# Patient Record
Sex: FEMALE | Race: WHITE | NOT HISPANIC OR LATINO | Employment: UNEMPLOYED | URBAN - METROPOLITAN AREA
[De-identification: names, ages, dates, MRNs, and addresses within clinical notes are randomized per-mention and may not be internally consistent; named-entity substitution may affect disease eponyms.]

---

## 2017-03-06 ENCOUNTER — HOSPITAL ENCOUNTER (EMERGENCY)
Facility: HOSPITAL | Age: 32
Discharge: HOME | End: 2017-03-06
Attending: EMERGENCY MEDICINE
Payer: MEDICAID

## 2017-03-06 VITALS
WEIGHT: 155 LBS | DIASTOLIC BLOOD PRESSURE: 90 MMHG | HEIGHT: 67 IN | SYSTOLIC BLOOD PRESSURE: 136 MMHG | OXYGEN SATURATION: 99 % | HEART RATE: 85 BPM | TEMPERATURE: 98 F | RESPIRATION RATE: 18 BRPM | BODY MASS INDEX: 24.33 KG/M2

## 2017-03-06 DIAGNOSIS — K02.9 DENTAL CARIES: ICD-10-CM

## 2017-03-06 DIAGNOSIS — K08.89 DENTALGIA: Primary | ICD-10-CM

## 2017-03-06 LAB
B-HCG UR QL: NEGATIVE
CTP QC/QA: YES

## 2017-03-06 PROCEDURE — 63600175 PHARM REV CODE 636 W HCPCS: Performed by: EMERGENCY MEDICINE

## 2017-03-06 PROCEDURE — 25000003 PHARM REV CODE 250: Performed by: EMERGENCY MEDICINE

## 2017-03-06 PROCEDURE — 96372 THER/PROPH/DIAG INJ SC/IM: CPT

## 2017-03-06 PROCEDURE — 99284 EMERGENCY DEPT VISIT MOD MDM: CPT | Mod: 25

## 2017-03-06 RX ORDER — PENICILLIN V POTASSIUM 500 MG/1
500 TABLET, FILM COATED ORAL 4 TIMES DAILY
Qty: 40 TABLET | Refills: 0 | Status: SHIPPED | OUTPATIENT
Start: 2017-03-06 | End: 2017-03-16

## 2017-03-06 RX ORDER — KETOROLAC TROMETHAMINE 30 MG/ML
30 INJECTION, SOLUTION INTRAMUSCULAR; INTRAVENOUS
Status: COMPLETED | OUTPATIENT
Start: 2017-03-06 | End: 2017-03-06

## 2017-03-06 RX ORDER — CHLORHEXIDINE GLUCONATE ORAL RINSE 1.2 MG/ML
15 SOLUTION DENTAL 2 TIMES DAILY
Qty: 118 ML | Refills: 0 | Status: SHIPPED | OUTPATIENT
Start: 2017-03-06 | End: 2017-03-20

## 2017-03-06 RX ORDER — KETOROLAC TROMETHAMINE 10 MG/1
10 TABLET, FILM COATED ORAL EVERY 8 HOURS
Qty: 12 TABLET | Refills: 0 | Status: SHIPPED | OUTPATIENT
Start: 2017-03-06 | End: 2017-03-06

## 2017-03-06 RX ORDER — FAMOTIDINE 20 MG/1
20 TABLET, FILM COATED ORAL
Status: COMPLETED | OUTPATIENT
Start: 2017-03-06 | End: 2017-03-06

## 2017-03-06 RX ORDER — DIPHENHYDRAMINE HCL 50 MG
50 CAPSULE ORAL
Status: COMPLETED | OUTPATIENT
Start: 2017-03-06 | End: 2017-03-06

## 2017-03-06 RX ORDER — ACETAMINOPHEN AND CODEINE PHOSPHATE 300; 30 MG/1; MG/1
1 TABLET ORAL EVERY 6 HOURS PRN
Qty: 8 TABLET | Refills: 0 | Status: SHIPPED | OUTPATIENT
Start: 2017-03-06 | End: 2017-03-16

## 2017-03-06 RX ADMIN — FAMOTIDINE 20 MG: 20 TABLET, FILM COATED ORAL at 03:03

## 2017-03-06 RX ADMIN — DIPHENHYDRAMINE HYDROCHLORIDE 50 MG: 50 CAPSULE ORAL at 03:03

## 2017-03-06 RX ADMIN — KETOROLAC TROMETHAMINE 30 MG: 30 INJECTION, SOLUTION INTRAMUSCULAR at 02:03

## 2017-03-06 NOTE — ED AVS SNAPSHOT
[facility] [address]  [place] [address]               Latisha Guzmán   3/6/2017  2:28 PM   ED    Description:  Female : 1985   Department:  [facility]           Your Care was Coordinated By:     Provider Role From To    Chanda García MD Attending Provider 17 1431 --      Reason for Visit     Dental Pain           Diagnoses this Visit        Comments    Dentalgia    -  Primary     Dental caries           ED Disposition     None           To Do List           Follow-up Information     Follow up with [facility].    Specialty:  Dental General Practice    Why:  As needed    Contact information:    [address] [facility] [address]  [phone]         These Medications        Disp Refills Start End    penicillin v potassium (VEETID) 500 MG tablet 40 tablet 0 3/6/2017 3/16/2017    Take 1 tablet (500 mg total) by mouth 4 (four) times daily. - Oral    chlorhexidine (PERIDEX) 0.12 % solution 118 mL 0 3/6/2017 3/20/2017    Use as directed 15 mLs in the mouth or throat 2 (two) times daily. - Mouth/Throat    ketorolac (TORADOL) 10 mg tablet 12 tablet 0 3/6/2017     Take 1 tablet (10 mg total) by mouth every 8 (eight) hours. - Oral      [facility]     [facility] -  Assistance  Registered nurses in the [facility] provide clinical advisement, health education, appointment booking, and other advisory services.  Call for this free service at [phone].             Medications           START taking these NEW medications        Refills    penicillin v potassium (VEETID) 500 MG tablet 0    Sig: Take 1 tablet (500 mg total) by mouth 4 (four) times daily.    Class: Print    Route: Oral    chlorhexidine (PERIDEX) 0.12 % solution 0    Sig: Use as directed 15 mLs in the mouth or throat 2 (two) times daily.    Class: Print    Route: Mouth/Throat    ketorolac (TORADOL) 10 mg tablet 0  "   Sig: Take 1 tablet (10 mg total) by mouth every 8 (eight) hours.    Class: Print    Route: Oral      These medications were administered today        Dose Freq    ketorolac injection 30 mg 30 mg ED 1 Time    Sig: Inject 30 mg into the muscle ED 1 Time.    Class: Normal    Route: Intramuscular           Verify that the below list of medications is an accurate representation of the medications you are currently taking.  If none reported, the list may be blank. If incorrect, please contact your healthcare provider. Carry this list with you in case of emergency.           Current Medications     chlorhexidine (PERIDEX) 0.12 % solution Use as directed 15 mLs in the mouth or throat 2 (two) times daily.    ketorolac (TORADOL) 10 mg tablet Take 1 tablet (10 mg total) by mouth every 8 (eight) hours.    penicillin v potassium (VEETID) 500 MG tablet Take 1 tablet (500 mg total) by mouth 4 (four) times daily.           Clinical Reference Information           Your Vitals Were     BP Pulse Temp Resp Height Weight    154/96 (BP Location: Right arm, Patient Position: Sitting) 101 98.3 °F (36.8 °C) (Oral) 20 5' 7" (1.702 m) 70.3 kg (155 lb)    Last Period SpO2 BMI          03/03/2017 99% 24.28 kg/m2        Allergies as of 3/6/2017        Reactions    Tramadol Hives      Immunizations Administered on Date of Encounter - 3/6/2017     None      ED Micro, Lab, POCT     Start Ordered       Status Ordering Provider    03/06/17 0000 03/06/17 1434  POCT urine pregnancy     Comments:  This order was created through External Result Entry    Completed       ED Imaging Orders     None        Discharge Instructions         GO DIRECTLY TO [facility] EMERGENCY DEPARTMENT IF YOU HAVE ANY FACIAL OR JAW SWELLING, DIFFICULTY SWALLOWING OR PAINFUL SWALLOWING    Understanding Tooth Decay  Plaque is a sticky coating of bacteria and other substances that forms on your teeth and gums. It can cause 2 serious problems: tooth decay and gum " disease. These problems damage the teeth and gums, and may even lead to tooth loss. When the mouth is well cared for, tooth decay and gum disease can be reversed in their early stages. Better yet, you can prevent these problems from starting by brushing and flossing daily and avoiding between meal snacking on foods high in sugar and starch.     Once tooth decay eats through the enamel, it spreads quickly through the softer dentin.       After tooth decay is removed, the hole is filled with a hard material.      How tooth decay develops  Tooth decay happens when bacteria in plaque make acids that eat away at the tooth. Cavities (also called caries) are holes that form in the teeth. They are most common in places that are hard to reach with a toothbrush. This includes the grooves at the tops of the back teeth, and on the sides where the teeth touch. In late stages, tooth decay can be painful. It can also lead to tooth loss.  Treating tooth decay  Tooth decay can be treated to keep it from moving farther into the tooth. This is often done by filling cavities. First, any tooth decay is removed. This protects the tooth from more damage. Then, the cavity is filled with a hard material. This filling protects the damaged tooth and restores the tooth's surface. If the tooth is severely damaged by decay, other treatments are available.  Follow-up visits  Visit your dental team at least every 6 months for a checkup and cleaning. If youre being treated for tooth decay or gum disease, you may need more frequent visits. These visits will likely decrease as your mouth care efforts start to pay off. Keep flossing and brushing, and maintain a healthy diet. Follow any special instructions your dentist or dental hygienist gives you. And enjoy flashing your healthy smile!  Date Last Reviewed: 6/30/2015  © 4320-3398 [employer]. [address] [place] [address], [place] [address]. All rights reserved. This information is not  "intended as a substitute for professional medical care. Always follow your healthcare professional's instructions.        Dental Abscess    An abscess is a pocket of pus at the tip of a tooth root in your jaw bone. It is caused by an infection at the root of the tooth. It can cause pain and swelling of the gum, cheek, or jaw. Pain may spread from the tooth to your ear or the area of your jaw on the same side. If the abscess isnt treated, it appears as a bubble or swelling on the gum near the tooth. The pressure that builds in this swelling is the source of the pain. More serious infections cause your face to swell.  An abscess can be caused by a crack in the tooth, a cavity, a gum infection, or a combination of these. Once the pulp of the tooth is exposed, bacteria can spread down the roots to the tip. If the bacteria are not stopped, they can damage the bone and soft tissue, and an abscess can form.  Home care  Follow these guidelines when caring for yourself at home:  · Avoid hot and cold foods and drinks. Your tooth may be sensitive to changes in temperature. Dont chew on the side of the infected tooth.  · If your tooth is chipped or cracked, or if there is a large open cavity, put oil of cloves directly on the tooth to relieve pain. You can buy oil of cloves at drugstores. Some pharmacies carry an over-the-counter "toothache kit." This contains a paste that you can put on the exposed tooth to make it less sensitive.  · Put a cold pack on your jaw over the sore area to help reduce pain.  · You may use over-the-counter medicine to ease pain, unless another medicine was prescribed. If you have chronic liver or kidney disease, talk with your healthcare provider before using acetaminophen or ibuprofen. Also talk with your provider if youve had a stomach ulcer or GI bleeding.  · An antibiotic will be prescribed. Take it until finished, even if you are feeling better after a few days.  Follow-up care  Follow up with " your dentist or an oral surgeon, or as advised. Once an infection occurs in a tooth, it will continue to be a problem until the infection is drained. This is done through surgery or a root canal. Or you may need to have your tooth pulled.  Call 911  Call 911 if any of these occur:  · Unusual drowsiness  · Headache or stiff neck  · Weakness or fainting  · Difficulty swallowing, breathing, or opening your mouth  · Swollen eyelids  When to seek medical advice  Call your healthcare provider right away if any of these occur:  · Your face becomes more swollen or red  · Pain gets worse or spreads to your neck  · Fever of 100.4º F (38.0º C) or higher, or as directed by your healthcare provider  · Pus drains from the tooth  Date Last Reviewed: 10/1/2016  © 4477-9487 [employer]. [address] [place] [address]. All rights reserved. This information is not intended as a substitute for professional medical care. Always follow your healthcare professional's instructions.          [facility] Sign-Up     Activating your [facility] account is as easy as 1-2-3!     1) Visit [email or web address], select Sign Up Now, enter this activation code and your date of birth, then select Next.  [email or web address]  Expires: 4/20/2017  2:59 PM      2) Create a username and password to use when you visit [facility] in the future and select a security question in case you lose your password and select Next.    3) Enter your e-mail address and click Sign Up!    Additional Information  If you have questions, please e-mail [email or web address] or call [phone] to talk to our [facility] staff. Remember, [facility] is NOT to be used for urgent needs. For medical emergencies, dial 911.         Smoking Cessation     If you would like to quit smoking:   You may be eligible for free services if you are a [place] resident and started smoking cigarettes before September 1, 1988.  Call the Smoking Cessation Trust (SCT) toll free at (919)  657-5150 or (095) 894-6966.   Call 1-800-QUIT-NOW if you do not meet the above criteria.             [facility] complies with applicable Federal civil rights laws and does not discriminate on the basis of race, color, national origin, age, disability, or sex.        Language Assistance Services     ATTENTION: Language assistance services are available, free of charge. Please call [phone].      ATENCIÓN: Si habla español, tiene a ayers disposición servicios gratuitos de asistencia lingüística. Llame al [phone].     CHÚ Ý: N?u b?n nói Ti?ng Vi?t, có các d?ch v? h? tr? ngôn ng? mi?n phí dành cho b?n. G?i s? [phone].

## 2017-03-06 NOTE — DISCHARGE INSTRUCTIONS
GO DIRECTLY TO [facility] EMERGENCY DEPARTMENT IF YOU HAVE ANY FACIAL OR JAW SWELLING, DIFFICULTY SWALLOWING OR PAINFUL SWALLOWING    Understanding Tooth Decay  Plaque is a sticky coating of bacteria and other substances that forms on your teeth and gums. It can cause 2 serious problems: tooth decay and gum disease. These problems damage the teeth and gums, and may even lead to tooth loss. When the mouth is well cared for, tooth decay and gum disease can be reversed in their early stages. Better yet, you can prevent these problems from starting by brushing and flossing daily and avoiding between meal snacking on foods high in sugar and starch.     Once tooth decay eats through the enamel, it spreads quickly through the softer dentin.       After tooth decay is removed, the hole is filled with a hard material.      How tooth decay develops  Tooth decay happens when bacteria in plaque make acids that eat away at the tooth. Cavities (also called caries) are holes that form in the teeth. They are most common in places that are hard to reach with a toothbrush. This includes the grooves at the tops of the back teeth, and on the sides where the teeth touch. In late stages, tooth decay can be painful. It can also lead to tooth loss.  Treating tooth decay  Tooth decay can be treated to keep it from moving farther into the tooth. This is often done by filling cavities. First, any tooth decay is removed. This protects the tooth from more damage. Then, the cavity is filled with a hard material. This filling protects the damaged tooth and restores the tooth's surface. If the tooth is severely damaged by decay, other treatments are available.  Follow-up visits  Visit your dental team at least every 6 months for a checkup and cleaning. If youre being treated for tooth decay or gum disease, you may need more frequent visits. These visits will likely decrease as your mouth care efforts start to pay off. Keep  "flossing and brushing, and maintain a healthy diet. Follow any special instructions your dentist or dental hygienist gives you. And enjoy flashing your healthy smile!  Date Last Reviewed: 6/30/2015 © 2000-2016 [employer]. [address] [place] [address]. All rights reserved. This information is not intended as a substitute for professional medical care. Always follow your healthcare professional's instructions.        Dental Abscess    An abscess is a pocket of pus at the tip of a tooth root in your jaw bone. It is caused by an infection at the root of the tooth. It can cause pain and swelling of the gum, cheek, or jaw. Pain may spread from the tooth to your ear or the area of your jaw on the same side. If the abscess isnt treated, it appears as a bubble or swelling on the gum near the tooth. The pressure that builds in this swelling is the source of the pain. More serious infections cause your face to swell.  An abscess can be caused by a crack in the tooth, a cavity, a gum infection, or a combination of these. Once the pulp of the tooth is exposed, bacteria can spread down the roots to the tip. If the bacteria are not stopped, they can damage the bone and soft tissue, and an abscess can form.  Home care  Follow these guidelines when caring for yourself at home:  · Avoid hot and cold foods and drinks. Your tooth may be sensitive to changes in temperature. Dont chew on the side of the infected tooth.  · If your tooth is chipped or cracked, or if there is a large open cavity, put oil of cloves directly on the tooth to relieve pain. You can buy oil of cloves at [employer]. Some pharmacies carry an over-the-counter "toothache kit." This contains a paste that you can put on the exposed tooth to make it less sensitive.  · Put a cold pack on your jaw over the sore area to help reduce pain.  · You may use over-the-counter medicine to ease pain, unless another medicine was prescribed. If you have " chronic liver or kidney disease, talk with your healthcare provider before using acetaminophen or ibuprofen. Also talk with your provider if youve had a stomach ulcer or GI bleeding.  · An antibiotic will be prescribed. Take it until finished, even if you are feeling better after a few days.  Follow-up care  Follow up with your dentist or an oral surgeon, or as advised. Once an infection occurs in a tooth, it will continue to be a problem until the infection is drained. This is done through surgery or a root canal. Or you may need to have your tooth pulled.  Call 911  Call 911 if any of these occur:  · Unusual drowsiness  · Headache or stiff neck  · Weakness or fainting  · Difficulty swallowing, breathing, or opening your mouth  · Swollen eyelids  When to seek medical advice  Call your healthcare provider right away if any of these occur:  · Your face becomes more swollen or red  · Pain gets worse or spreads to your neck  · Fever of 100.4º F (38.0º C) or higher, or as directed by your healthcare provider  · Pus drains from the tooth  Date Last Reviewed: 10/1/2016  © 7860-1706 [employer]. [address] [place] [address]. All rights reserved. This information is not intended as a substitute for professional medical care. Always follow your healthcare professional's instructions.

## 2017-03-06 NOTE — ED NOTES
Top front gum pain, reports had abscess to area that opened today.  Spoke with dentist and was told to come to ED for abx treatment.

## 2017-03-06 NOTE — ED PROVIDER NOTES
"Encounter Date: 3/6/2017       History     Chief Complaint   Patient presents with    Dental Pain     top front denital pain unable to schedule apt for 2 weeks      Review of patient's allergies indicates:   Allergen Reactions    Tramadol Hives     HPI Comments: 31 Y/O F WITH DENTAL PAIN.  PT REPORTS "SINCE I WAS 11 MY TEETH JUST BREAK FOR NO REASON.  I HAD THEM FIXED UNTIL I WAS 20 BUT THEN MEDICAID STOPPED PAYING FOR IT. I HAVE RECENTLY GOTTEN MEDICAID AND I HAVE AN APPOINTMENT WITH A DENTIST TO PULL ALL OF THEM IN 2.5 WEEKS.  THEY HAVE BEEN HURTING SO BAD FOR A FEW WEEKS AND DRAINING GREEN STUFF."  NO FEVER/CHILLS.  NO SWOLLEN NODES.  NO PAINFUL OR DIFFICULTY SWALLOWING.  NO FACIAL SWELLING.  NO DENTAL TRAUMA.     The history is provided by the patient. No  was used.     History reviewed. No pertinent past medical history.  Past Surgical History:   Procedure Laterality Date    APPENDECTOMY       History reviewed. No pertinent family history.  Social History   Substance Use Topics    Smoking status: Current Every Day Smoker    Smokeless tobacco: None    Alcohol use No     Review of Systems   Constitutional: Negative for activity change, appetite change, chills and fever.   HENT: Positive for dental problem and ear pain. Negative for congestion, drooling, ear discharge, trouble swallowing and voice change.    Eyes: Negative.    Respiratory: Negative for cough and shortness of breath.    Cardiovascular: Negative for chest pain.   Gastrointestinal: Negative for nausea and vomiting.   Endocrine: Negative.    Genitourinary: Negative.    Musculoskeletal: Negative.    Skin: Negative for rash.   Allergic/Immunologic: Negative for immunocompromised state.   Neurological: Negative.    Hematological: Negative.    Psychiatric/Behavioral: Negative.    All other systems reviewed and are negative.      Physical Exam   Initial Vitals   BP Pulse Resp Temp SpO2   03/06/17 1423 03/06/17 1423 03/06/17 1423 " 03/06/17 1423 03/06/17 1423   154/96 101 20 98.3 °F (36.8 °C) 99 %     Physical Exam    Nursing note and vitals reviewed.  Constitutional: She appears well-developed and well-nourished. She is not diaphoretic. No distress.   HENT:   Head: Normocephalic and atraumatic.   Mouth/Throat: Uvula is midline and oropharynx is clear and moist.   DIFFUSE DENTAL CARIES.  NO ABSCESS NOTED.  GUMS ARE ERYTHEMATOUS.  DIFFUSE GINGIVAL TENDERNESS   Eyes: Conjunctivae are normal. No scleral icterus.   Neck: Normal range of motion. Neck supple.   Cardiovascular: Normal rate, regular rhythm and normal heart sounds. Exam reveals no gallop and no friction rub.    No murmur heard.  Pulmonary/Chest: Breath sounds normal. No respiratory distress. She has no wheezes. She has no rhonchi. She has no rales.   Abdominal: Soft. Bowel sounds are normal. She exhibits no distension. There is no tenderness. There is no rebound and no guarding.   Musculoskeletal: Normal range of motion.   Lymphadenopathy:     She has no cervical adenopathy.   Neurological: She is alert and oriented to person, place, and time.   Skin: Skin is warm and dry. No rash noted. No erythema.   Psychiatric: She has a normal mood and affect. Her behavior is normal.         ED Course   Procedures  Labs Reviewed - No data to display          Medical Decision Making:   Initial Assessment:   33 Y/O WITH POOR DENTITION, DIFFUSE BROKEN TEETH AND DENTAL CARIES.  NO ABSCESS OR FACIAL SWELLING ON EXAM.  PT IS AFEBRILE AND NON-TOXIC.  Differential Diagnosis:   DENTAL CARIES, DENTAL ABSCESS  ED Management:  PT WILL BE D/C HOME WITH RX FOR PCN AND PAIN MEDS.  SHE HAS A F/U APPT WITH DENTIST IN 2 WEEKS THAT I HAVE ENCOURAGED HER TO MOVE UP IF POSSIBLE.  I HAVE ALSO INSTRUCTED HER TO PRESENT TO [facility] ED IF SHE NOTICES ANY FACIAL OR JAW SWELLING, DIFFICULTY SWALLOWING, OR PAINFUL SWALLOWING.  PT UNDERSTANDS INSTRUCTIONS.     WHILE IN ED, PT REPORTED ITCHING S/P TORADOL INJECTION.  WE HAVE  TREATED HER WITH PO BENADRYL AND PEPCID.  SHE REPORTS NO ALLERGY TO CODEINE SO I WILL RX TYLENOL #3.  SHE IS INSTRUCTED TO STOP TAKING THE MEDICATION IMMEDIATELY IF SHE DEVELOPS ANY SIGN OF ALLERGY AND TO RETURN TO ED WITH ANY WORSENING OF CONDITION                   ED Course     Clinical Impression:   The primary encounter diagnosis was Dentalgia. A diagnosis of Dental caries was also pertinent to this visit.    Disposition:   Disposition: Discharged  Condition: Stable       Chanda García MD  03/06/17 5369       Chanda García MD  03/06/17 5896

## 2018-01-30 ENCOUNTER — HOSPITAL ENCOUNTER (EMERGENCY)
Facility: HOSPITAL | Age: 33
Discharge: HOME | End: 2018-01-30
Payer: MEDICAID

## 2018-01-30 VITALS
WEIGHT: 158 LBS | DIASTOLIC BLOOD PRESSURE: 78 MMHG | HEART RATE: 80 BPM | OXYGEN SATURATION: 100 % | SYSTOLIC BLOOD PRESSURE: 154 MMHG | HEIGHT: 66 IN | RESPIRATION RATE: 20 BRPM | TEMPERATURE: 99 F | BODY MASS INDEX: 25.39 KG/M2
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
## 2018-01-30 DIAGNOSIS — S40.011A CONTUSION OF RIGHT SHOULDER, INITIAL ENCOUNTER: Primary | ICD-10-CM

## 2018-01-30 PROCEDURE — 99284 EMERGENCY DEPT VISIT MOD MDM: CPT

## 2018-01-30 RX ORDER — QUETIAPINE FUMARATE 100 MG/1
TABLET, FILM COATED ORAL
COMMUNITY
End: 2018-11-01

## 2018-01-30 RX ORDER — IBUPROFEN 600 MG/1
600 TABLET ORAL
Status: DISCONTINUED | OUTPATIENT
Start: 2018-01-30 | End: 2018-01-30 | Stop reason: HOSPADM

## 2018-01-30 RX ORDER — IBUPROFEN 600 MG/1
600 TABLET ORAL EVERY 6 HOURS PRN
Qty: 20 TABLET | Refills: 0 | Status: ON HOLD | OUTPATIENT
Start: 2018-01-30 | End: 2022-05-23 | Stop reason: HOSPADM

## 2018-01-30 RX ORDER — METHOCARBAMOL 500 MG/1
1000 TABLET, FILM COATED ORAL 3 TIMES DAILY PRN
Qty: 30 TABLET | Refills: 0 | Status: SHIPPED | OUTPATIENT
Start: 2018-01-30 | End: 2018-02-04

## 2018-01-30 RX ORDER — VENLAFAXINE 100 MG/1
TABLET ORAL 2 TIMES DAILY
COMMUNITY
End: 2018-11-01

## 2018-01-30 RX ORDER — METHOCARBAMOL 500 MG/1
1000 TABLET, FILM COATED ORAL
Status: DISCONTINUED | OUTPATIENT
Start: 2018-01-30 | End: 2018-01-30 | Stop reason: HOSPADM

## 2018-01-30 RX ORDER — CLONAZEPAM 0.5 MG/1
0.5 TABLET ORAL 2 TIMES DAILY PRN
COMMUNITY
End: 2018-11-01
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
## 2018-11-01 ENCOUNTER — ANESTHESIA (OUTPATIENT)
Dept: SURGERY | Facility: HOSPITAL | Age: 33
DRG: 854 | End: 2018-11-01
Payer: MEDICAID

## 2018-11-01 ENCOUNTER — HOSPITAL ENCOUNTER (EMERGENCY)
Facility: HOSPITAL | Age: 33
Discharge: ANOTHER HOSPITAL | End: 2018-11-01
Attending: EMERGENCY MEDICINE
Payer: MEDICAID

## 2018-11-01 ENCOUNTER — ANESTHESIA EVENT (OUTPATIENT)
Dept: SURGERY | Facility: HOSPITAL | Age: 33
DRG: 854 | End: 2018-11-01
Payer: MEDICAID

## 2018-11-01 ENCOUNTER — HOSPITAL ENCOUNTER (INPATIENT)
Facility: HOSPITAL | Age: 33
LOS: 1 days | Discharge: HOME | DRG: 854 | End: 2018-11-02
Attending: EMERGENCY MEDICINE | Admitting: UROLOGY
Payer: MEDICAID

## 2018-11-01 ENCOUNTER — TELEPHONE (OUTPATIENT)
Dept: UROLOGY | Facility: CLINIC | Age: 33
End: 2018-11-01

## 2018-11-01 VITALS
WEIGHT: 170 LBS | HEART RATE: 118 BPM | HEIGHT: 67 IN | TEMPERATURE: 101 F | DIASTOLIC BLOOD PRESSURE: 77 MMHG | BODY MASS INDEX: 26.68 KG/M2 | OXYGEN SATURATION: 99 % | RESPIRATION RATE: 26 BRPM | SYSTOLIC BLOOD PRESSURE: 144 MMHG

## 2018-11-01 DIAGNOSIS — N20.0 LEFT NEPHROLITHIASIS: Primary | ICD-10-CM

## 2018-11-01 DIAGNOSIS — N20.1 URETERAL STONE: ICD-10-CM

## 2018-11-01 DIAGNOSIS — N20.0 KIDNEY STONE: Primary | ICD-10-CM

## 2018-11-01 DIAGNOSIS — N12 PYELONEPHRITIS: ICD-10-CM

## 2018-11-01 DIAGNOSIS — R50.9 FEVER, UNSPECIFIED FEVER CAUSE: ICD-10-CM

## 2018-11-01 DIAGNOSIS — R11.2 NAUSEA AND VOMITING, INTRACTABILITY OF VOMITING NOT SPECIFIED, UNSPECIFIED VOMITING TYPE: ICD-10-CM

## 2018-11-01 DIAGNOSIS — A41.9 SEPSIS, DUE TO UNSPECIFIED ORGANISM: ICD-10-CM

## 2018-11-01 DIAGNOSIS — N20.1 LEFT URETERAL STONE: Primary | ICD-10-CM

## 2018-11-01 DIAGNOSIS — N13.30 HYDRONEPHROSIS, UNSPECIFIED HYDRONEPHROSIS TYPE: ICD-10-CM

## 2018-11-01 DIAGNOSIS — N20.1 LEFT URETERAL STONE: ICD-10-CM

## 2018-11-01 LAB
ALBUMIN SERPL BCP-MCNC: 4 G/DL
ALP SERPL-CCNC: 91 U/L
ALT SERPL W/O P-5'-P-CCNC: 11 U/L
ANION GAP SERPL CALC-SCNC: 12 MMOL/L
AST SERPL-CCNC: 16 U/L
B-HCG UR QL: NEGATIVE
BACTERIA #/AREA URNS AUTO: ABNORMAL /HPF
BACTERIA #/AREA URNS HPF: ABNORMAL /HPF
BILIRUB SERPL-MCNC: 0.5 MG/DL
BILIRUB UR QL STRIP: NEGATIVE
BILIRUB UR QL STRIP: NEGATIVE
BUN SERPL-MCNC: 14 MG/DL
CALCIUM SERPL-MCNC: 9.2 MG/DL
CHLORIDE SERPL-SCNC: 100 MMOL/L
CLARITY UR REFRACT.AUTO: ABNORMAL
CLARITY UR: CLEAR
CO2 SERPL-SCNC: 21 MMOL/L
COLOR UR AUTO: ABNORMAL
COLOR UR: YELLOW
CREAT SERPL-MCNC: 0.9 MG/DL
CTP QC/QA: YES
ERYTHROCYTE [DISTWIDTH] IN BLOOD BY AUTOMATED COUNT: 17.6 %
EST. GFR  (AFRICAN AMERICAN): >60 ML/MIN/1.73 M^2
EST. GFR  (NON AFRICAN AMERICAN): >60 ML/MIN/1.73 M^2
GLUCOSE SERPL-MCNC: 104 MG/DL
GLUCOSE UR QL STRIP: NEGATIVE
GLUCOSE UR QL STRIP: NEGATIVE
GRAM STN SPEC: NORMAL
GRAM STN SPEC: NORMAL
HCT VFR BLD AUTO: 33.5 %
HGB BLD-MCNC: 10.5 G/DL
HGB UR QL STRIP: ABNORMAL
HGB UR QL STRIP: ABNORMAL
HYALINE CASTS #/AREA URNS LPF: 1 /LPF
HYALINE CASTS UR QL AUTO: 0 /LPF
KETONES UR QL STRIP: NEGATIVE
KETONES UR QL STRIP: NEGATIVE
LACTATE SERPL-SCNC: 1.3 MMOL/L
LACTATE SERPL-SCNC: 1.7 MMOL/L
LEUKOCYTE ESTERASE UR QL STRIP: ABNORMAL
LEUKOCYTE ESTERASE UR QL STRIP: ABNORMAL
LIPASE SERPL-CCNC: 5 U/L
MAGNESIUM SERPL-MCNC: 1.8 MG/DL
MCH RBC QN AUTO: 24.9 PG
MCHC RBC AUTO-ENTMCNC: 31.3 G/DL
MCV RBC AUTO: 80 FL
MICROSCOPIC COMMENT: ABNORMAL
MICROSCOPIC COMMENT: ABNORMAL
NITRITE UR QL STRIP: NEGATIVE
NITRITE UR QL STRIP: NEGATIVE
PH UR STRIP: 5 [PH] (ref 5–8)
PH UR STRIP: 6 [PH] (ref 5–8)
PHOSPHATE SERPL-MCNC: 2.3 MG/DL
PLATELET # BLD AUTO: 434 K/UL
PMV BLD AUTO: 8.5 FL
POTASSIUM SERPL-SCNC: 2.9 MMOL/L
PROT SERPL-MCNC: 8 G/DL
PROT UR QL STRIP: ABNORMAL
PROT UR QL STRIP: ABNORMAL
RBC # BLD AUTO: 4.21 M/UL
RBC #/AREA URNS AUTO: >100 /HPF (ref 0–4)
RBC #/AREA URNS HPF: 8 /HPF (ref 0–4)
SODIUM SERPL-SCNC: 133 MMOL/L
SP GR UR STRIP: >=1.03 (ref 1–1.03)
SP GR UR STRIP: >=1.03 (ref 1–1.03)
SQUAMOUS #/AREA URNS AUTO: 2 /HPF
SQUAMOUS #/AREA URNS HPF: 4 /HPF
URN SPEC COLLECT METH UR: ABNORMAL
URN SPEC COLLECT METH UR: ABNORMAL
UROBILINOGEN UR STRIP-ACNC: NEGATIVE EU/DL
WBC # BLD AUTO: 19.61 K/UL
WBC #/AREA URNS AUTO: 25 /HPF (ref 0–5)
WBC #/AREA URNS HPF: >100 /HPF (ref 0–5)

## 2018-11-01 PROCEDURE — 11000001 HC ACUTE MED/SURG PRIVATE ROOM

## 2018-11-01 PROCEDURE — 25000003 PHARM REV CODE 250: Performed by: UROLOGY

## 2018-11-01 PROCEDURE — 99233 SBSQ HOSP IP/OBS HIGH 50: CPT | Mod: 25,,, | Performed by: UROLOGY

## 2018-11-01 PROCEDURE — 99291 CRITICAL CARE FIRST HOUR: CPT | Mod: 25

## 2018-11-01 PROCEDURE — 25000242 PHARM REV CODE 250 ALT 637 W/ HCPCS: Performed by: NURSE ANESTHETIST, CERTIFIED REGISTERED

## 2018-11-01 PROCEDURE — 25000003 PHARM REV CODE 250: Performed by: STUDENT IN AN ORGANIZED HEALTH CARE EDUCATION/TRAINING PROGRAM

## 2018-11-01 PROCEDURE — 96374 THER/PROPH/DIAG INJ IV PUSH: CPT

## 2018-11-01 PROCEDURE — S0028 INJECTION, FAMOTIDINE, 20 MG: HCPCS | Performed by: STUDENT IN AN ORGANIZED HEALTH CARE EDUCATION/TRAINING PROGRAM

## 2018-11-01 PROCEDURE — 25500020 PHARM REV CODE 255: Performed by: EMERGENCY MEDICINE

## 2018-11-01 PROCEDURE — C2617 STENT, NON-COR, TEM W/O DEL: HCPCS | Performed by: UROLOGY

## 2018-11-01 PROCEDURE — 25000003 PHARM REV CODE 250: Performed by: EMERGENCY MEDICINE

## 2018-11-01 PROCEDURE — 63600175 PHARM REV CODE 636 W HCPCS: Performed by: EMERGENCY MEDICINE

## 2018-11-01 PROCEDURE — 83605 ASSAY OF LACTIC ACID: CPT | Mod: 91

## 2018-11-01 PROCEDURE — 83605 ASSAY OF LACTIC ACID: CPT

## 2018-11-01 PROCEDURE — 80053 COMPREHEN METABOLIC PANEL: CPT

## 2018-11-01 PROCEDURE — 63600175 PHARM REV CODE 636 W HCPCS: Performed by: NURSE ANESTHETIST, CERTIFIED REGISTERED

## 2018-11-01 PROCEDURE — 85027 COMPLETE CBC AUTOMATED: CPT

## 2018-11-01 PROCEDURE — 87086 URINE CULTURE/COLONY COUNT: CPT

## 2018-11-01 PROCEDURE — 96375 TX/PRO/DX INJ NEW DRUG ADDON: CPT

## 2018-11-01 PROCEDURE — 36000706: Performed by: UROLOGY

## 2018-11-01 PROCEDURE — 87205 SMEAR GRAM STAIN: CPT

## 2018-11-01 PROCEDURE — 63600175 PHARM REV CODE 636 W HCPCS: Performed by: UROLOGY

## 2018-11-01 PROCEDURE — 83690 ASSAY OF LIPASE: CPT

## 2018-11-01 PROCEDURE — 63600175 PHARM REV CODE 636 W HCPCS: Performed by: STUDENT IN AN ORGANIZED HEALTH CARE EDUCATION/TRAINING PROGRAM

## 2018-11-01 PROCEDURE — 36415 COLL VENOUS BLD VENIPUNCTURE: CPT

## 2018-11-01 PROCEDURE — 63600175 PHARM REV CODE 636 W HCPCS: Performed by: PHYSICIAN ASSISTANT

## 2018-11-01 PROCEDURE — 81000 URINALYSIS NONAUTO W/SCOPE: CPT

## 2018-11-01 PROCEDURE — 52332 CYSTOSCOPY AND TREATMENT: CPT | Mod: LT,,, | Performed by: UROLOGY

## 2018-11-01 PROCEDURE — 99285 EMERGENCY DEPT VISIT HI MDM: CPT | Mod: 25

## 2018-11-01 PROCEDURE — 84100 ASSAY OF PHOSPHORUS: CPT

## 2018-11-01 PROCEDURE — 76000 FLUOROSCOPY <1 HR PHYS/QHP: CPT | Mod: 26,59,, | Performed by: UROLOGY

## 2018-11-01 PROCEDURE — 81001 URINALYSIS AUTO W/SCOPE: CPT

## 2018-11-01 PROCEDURE — 87040 BLOOD CULTURE FOR BACTERIA: CPT

## 2018-11-01 PROCEDURE — 25000003 PHARM REV CODE 250: Performed by: NURSE ANESTHETIST, CERTIFIED REGISTERED

## 2018-11-01 PROCEDURE — 96361 HYDRATE IV INFUSION ADD-ON: CPT

## 2018-11-01 PROCEDURE — 71000044 HC DOSC ROUTINE RECOVERY FIRST HOUR: Performed by: UROLOGY

## 2018-11-01 PROCEDURE — 71000015 HC POSTOP RECOV 1ST HR: Performed by: UROLOGY

## 2018-11-01 PROCEDURE — D9220A PRA ANESTHESIA: Mod: ANES,,, | Performed by: ANESTHESIOLOGY

## 2018-11-01 PROCEDURE — 96365 THER/PROPH/DIAG IV INF INIT: CPT | Mod: 59

## 2018-11-01 PROCEDURE — D9220A PRA ANESTHESIA: Mod: CRNA,,, | Performed by: NURSE ANESTHETIST, CERTIFIED REGISTERED

## 2018-11-01 PROCEDURE — 81025 URINE PREGNANCY TEST: CPT | Performed by: EMERGENCY MEDICINE

## 2018-11-01 PROCEDURE — 83735 ASSAY OF MAGNESIUM: CPT

## 2018-11-01 PROCEDURE — 0T778DZ DILATION OF LEFT URETER WITH INTRALUMINAL DEVICE, VIA NATURAL OR ARTIFICIAL OPENING ENDOSCOPIC: ICD-10-PCS | Performed by: UROLOGY

## 2018-11-01 PROCEDURE — 36000707: Performed by: UROLOGY

## 2018-11-01 PROCEDURE — 37000009 HC ANESTHESIA EA ADD 15 MINS: Performed by: UROLOGY

## 2018-11-01 PROCEDURE — 25000003 PHARM REV CODE 250: Performed by: PHYSICIAN ASSISTANT

## 2018-11-01 PROCEDURE — 37000008 HC ANESTHESIA 1ST 15 MINUTES: Performed by: UROLOGY

## 2018-11-01 PROCEDURE — 99285 EMERGENCY DEPT VISIT HI MDM: CPT | Mod: ,,, | Performed by: PHYSICIAN ASSISTANT

## 2018-11-01 PROCEDURE — C1769 GUIDE WIRE: HCPCS | Performed by: UROLOGY

## 2018-11-01 DEVICE — STENT URETERAL UNIV 6FR 24CM: Type: IMPLANTABLE DEVICE | Site: URETER | Status: FUNCTIONAL

## 2018-11-01 RX ORDER — LORAZEPAM 2 MG/ML
0.5 INJECTION INTRAMUSCULAR ONCE
Status: COMPLETED | OUTPATIENT
Start: 2018-11-01 | End: 2018-11-01

## 2018-11-01 RX ORDER — ALBUTEROL SULFATE 90 UG/1
AEROSOL, METERED RESPIRATORY (INHALATION)
Status: DISCONTINUED | OUTPATIENT
Start: 2018-11-01 | End: 2018-11-01

## 2018-11-01 RX ORDER — MORPHINE SULFATE 4 MG/ML
4 INJECTION, SOLUTION INTRAMUSCULAR; INTRAVENOUS
Status: COMPLETED | OUTPATIENT
Start: 2018-11-01 | End: 2018-11-01

## 2018-11-01 RX ORDER — ACETAMINOPHEN 325 MG/1
650 TABLET ORAL
Status: COMPLETED | OUTPATIENT
Start: 2018-11-01 | End: 2018-11-01

## 2018-11-01 RX ORDER — CEFTRIAXONE 1 G/1
1 INJECTION, POWDER, FOR SOLUTION INTRAMUSCULAR; INTRAVENOUS
Status: COMPLETED | OUTPATIENT
Start: 2018-11-01 | End: 2018-11-01

## 2018-11-01 RX ORDER — MORPHINE SULFATE 2 MG/ML
5 INJECTION, SOLUTION INTRAMUSCULAR; INTRAVENOUS
Status: COMPLETED | OUTPATIENT
Start: 2018-11-01 | End: 2018-11-01

## 2018-11-01 RX ORDER — HYDROMORPHONE HYDROCHLORIDE 1 MG/ML
1 INJECTION, SOLUTION INTRAMUSCULAR; INTRAVENOUS; SUBCUTANEOUS ONCE
Status: COMPLETED | OUTPATIENT
Start: 2018-11-01 | End: 2018-11-01

## 2018-11-01 RX ORDER — OXYCODONE HYDROCHLORIDE 10 MG/1
10 TABLET ORAL EVERY 4 HOURS PRN
Status: DISCONTINUED | OUTPATIENT
Start: 2018-11-01 | End: 2018-11-01

## 2018-11-01 RX ORDER — SODIUM CHLORIDE 9 MG/ML
INJECTION, SOLUTION INTRAVENOUS CONTINUOUS PRN
Status: DISCONTINUED | OUTPATIENT
Start: 2018-11-01 | End: 2018-11-01

## 2018-11-01 RX ORDER — ONDANSETRON 2 MG/ML
4 INJECTION INTRAMUSCULAR; INTRAVENOUS EVERY 6 HOURS PRN
Status: DISCONTINUED | OUTPATIENT
Start: 2018-11-01 | End: 2018-11-02 | Stop reason: HOSPADM

## 2018-11-01 RX ORDER — ONDANSETRON 2 MG/ML
4 INJECTION INTRAMUSCULAR; INTRAVENOUS
Status: COMPLETED | OUTPATIENT
Start: 2018-11-01 | End: 2018-11-01

## 2018-11-01 RX ORDER — SODIUM CHLORIDE 0.9 % (FLUSH) 0.9 %
3 SYRINGE (ML) INJECTION
Status: DISCONTINUED | OUTPATIENT
Start: 2018-11-01 | End: 2018-11-02 | Stop reason: HOSPADM

## 2018-11-01 RX ORDER — DIPHENHYDRAMINE HCL 25 MG
25 CAPSULE ORAL
Status: COMPLETED | OUTPATIENT
Start: 2018-11-01 | End: 2018-11-01

## 2018-11-01 RX ORDER — ACETAMINOPHEN 325 MG/1
650 TABLET ORAL
Status: DISCONTINUED | OUTPATIENT
Start: 2018-11-01 | End: 2018-11-01

## 2018-11-01 RX ORDER — HYDROMORPHONE HYDROCHLORIDE 1 MG/ML
1 INJECTION, SOLUTION INTRAMUSCULAR; INTRAVENOUS; SUBCUTANEOUS
Status: DISPENSED | OUTPATIENT
Start: 2018-11-01 | End: 2018-11-01

## 2018-11-01 RX ORDER — TAMSULOSIN HYDROCHLORIDE 0.4 MG/1
0.4 CAPSULE ORAL NIGHTLY
Status: DISCONTINUED | OUTPATIENT
Start: 2018-11-01 | End: 2018-11-02 | Stop reason: HOSPADM

## 2018-11-01 RX ORDER — OXYCODONE HYDROCHLORIDE 10 MG/1
10 TABLET ORAL EVERY 4 HOURS PRN
Status: DISCONTINUED | OUTPATIENT
Start: 2018-11-01 | End: 2018-11-02 | Stop reason: HOSPADM

## 2018-11-01 RX ORDER — ROCURONIUM BROMIDE 10 MG/ML
INJECTION, SOLUTION INTRAVENOUS
Status: DISCONTINUED | OUTPATIENT
Start: 2018-11-01 | End: 2018-11-01

## 2018-11-01 RX ORDER — DEXAMETHASONE SODIUM PHOSPHATE 4 MG/ML
INJECTION, SOLUTION INTRA-ARTICULAR; INTRALESIONAL; INTRAMUSCULAR; INTRAVENOUS; SOFT TISSUE
Status: DISCONTINUED | OUTPATIENT
Start: 2018-11-01 | End: 2018-11-01

## 2018-11-01 RX ORDER — ETOMIDATE 2 MG/ML
INJECTION INTRAVENOUS
Status: DISCONTINUED | OUTPATIENT
Start: 2018-11-01 | End: 2018-11-01

## 2018-11-01 RX ORDER — POTASSIUM CHLORIDE 20 MEQ/1
40 TABLET, EXTENDED RELEASE ORAL EVERY 4 HOURS
Status: COMPLETED | OUTPATIENT
Start: 2018-11-01 | End: 2018-11-01

## 2018-11-01 RX ORDER — MIDAZOLAM HYDROCHLORIDE 1 MG/ML
INJECTION, SOLUTION INTRAMUSCULAR; INTRAVENOUS
Status: DISCONTINUED | OUTPATIENT
Start: 2018-11-01 | End: 2018-11-01

## 2018-11-01 RX ORDER — ACETAMINOPHEN 10 MG/ML
1000 INJECTION, SOLUTION INTRAVENOUS EVERY 8 HOURS
Status: COMPLETED | OUTPATIENT
Start: 2018-11-01 | End: 2018-11-02

## 2018-11-01 RX ORDER — GENTAMICIN SULFATE 80 MG/100ML
INJECTION, SOLUTION INTRAVENOUS
Status: DISCONTINUED | OUTPATIENT
Start: 2018-11-01 | End: 2018-11-01

## 2018-11-01 RX ORDER — HYDROMORPHONE HYDROCHLORIDE 1 MG/ML
0.2 INJECTION, SOLUTION INTRAMUSCULAR; INTRAVENOUS; SUBCUTANEOUS EVERY 5 MIN PRN
Status: DISCONTINUED | OUTPATIENT
Start: 2018-11-01 | End: 2018-11-01 | Stop reason: HOSPADM

## 2018-11-01 RX ORDER — SUCCINYLCHOLINE CHLORIDE 20 MG/ML
INJECTION INTRAMUSCULAR; INTRAVENOUS
Status: DISCONTINUED | OUTPATIENT
Start: 2018-11-01 | End: 2018-11-01

## 2018-11-01 RX ORDER — FAMOTIDINE 10 MG/ML
20 INJECTION INTRAVENOUS 2 TIMES DAILY
Status: DISCONTINUED | OUTPATIENT
Start: 2018-11-01 | End: 2018-11-02 | Stop reason: HOSPADM

## 2018-11-01 RX ORDER — ACETAMINOPHEN 10 MG/ML
1000 INJECTION, SOLUTION INTRAVENOUS
Status: COMPLETED | OUTPATIENT
Start: 2018-11-01 | End: 2018-11-01

## 2018-11-01 RX ORDER — FENTANYL CITRATE 50 UG/ML
INJECTION, SOLUTION INTRAMUSCULAR; INTRAVENOUS
Status: DISCONTINUED | OUTPATIENT
Start: 2018-11-01 | End: 2018-11-01

## 2018-11-01 RX ORDER — POTASSIUM CHLORIDE 7.46 G/1000ML
10 INJECTION, SOLUTION INTRAVENOUS
Status: DISCONTINUED | OUTPATIENT
Start: 2018-11-01 | End: 2018-11-01

## 2018-11-01 RX ORDER — ONDANSETRON 2 MG/ML
INJECTION INTRAMUSCULAR; INTRAVENOUS
Status: DISCONTINUED | OUTPATIENT
Start: 2018-11-01 | End: 2018-11-01

## 2018-11-01 RX ORDER — LIDOCAINE HCL/PF 100 MG/5ML
SYRINGE (ML) INTRAVENOUS
Status: DISCONTINUED | OUTPATIENT
Start: 2018-11-01 | End: 2018-11-01

## 2018-11-01 RX ORDER — SODIUM CHLORIDE 9 MG/ML
INJECTION, SOLUTION INTRAVENOUS CONTINUOUS
Status: DISCONTINUED | OUTPATIENT
Start: 2018-11-01 | End: 2018-11-02 | Stop reason: HOSPADM

## 2018-11-01 RX ORDER — OXYCODONE HYDROCHLORIDE 5 MG/1
5 TABLET ORAL EVERY 4 HOURS PRN
Status: DISCONTINUED | OUTPATIENT
Start: 2018-11-01 | End: 2018-11-01

## 2018-11-01 RX ORDER — DIPHENHYDRAMINE HCL 25 MG
25 CAPSULE ORAL EVERY 12 HOURS PRN
Status: DISCONTINUED | OUTPATIENT
Start: 2018-11-01 | End: 2018-11-02 | Stop reason: HOSPADM

## 2018-11-01 RX ORDER — SODIUM CHLORIDE 9 MG/ML
1000 INJECTION, SOLUTION INTRAVENOUS
Status: COMPLETED | OUTPATIENT
Start: 2018-11-01 | End: 2018-11-01

## 2018-11-01 RX ADMIN — ONDANSETRON 4 MG: 2 INJECTION INTRAMUSCULAR; INTRAVENOUS at 05:11

## 2018-11-01 RX ADMIN — SODIUM CHLORIDE, SODIUM GLUCONATE, SODIUM ACETATE, POTASSIUM CHLORIDE, MAGNESIUM CHLORIDE, SODIUM PHOSPHATE, DIBASIC, AND POTASSIUM PHOSPHATE: .53; .5; .37; .037; .03; .012; .00082 INJECTION, SOLUTION INTRAVENOUS at 07:11

## 2018-11-01 RX ADMIN — SUCCINYLCHOLINE CHLORIDE 100 MG: 20 INJECTION, SOLUTION INTRAMUSCULAR; INTRAVENOUS at 07:11

## 2018-11-01 RX ADMIN — CEFTRIAXONE 1 G: 1 INJECTION, SOLUTION INTRAVENOUS at 03:11

## 2018-11-01 RX ADMIN — SODIUM CHLORIDE 1000 ML: 0.9 INJECTION, SOLUTION INTRAVENOUS at 02:11

## 2018-11-01 RX ADMIN — ACETAMINOPHEN 1000 MG: 10 INJECTION, SOLUTION INTRAVENOUS at 02:11

## 2018-11-01 RX ADMIN — ALBUTEROL SULFATE 2 PUFF: 90 AEROSOL, METERED RESPIRATORY (INHALATION) at 07:11

## 2018-11-01 RX ADMIN — MORPHINE SULFATE 4 MG: 4 INJECTION, SOLUTION INTRAMUSCULAR; INTRAVENOUS at 05:11

## 2018-11-01 RX ADMIN — ROCURONIUM BROMIDE 5 MG: 10 INJECTION, SOLUTION INTRAVENOUS at 07:11

## 2018-11-01 RX ADMIN — ACETAMINOPHEN 1000 MG: 10 INJECTION, SOLUTION INTRAVENOUS at 06:11

## 2018-11-01 RX ADMIN — ETOMIDATE 26 MG: 2 INJECTION, SOLUTION INTRAVENOUS at 07:11

## 2018-11-01 RX ADMIN — GENTAMICIN SULFATE 240 MG: 80 INJECTION, SOLUTION INTRAVENOUS at 07:11

## 2018-11-01 RX ADMIN — SODIUM CHLORIDE 1000 ML: 0.9 INJECTION, SOLUTION INTRAVENOUS at 12:11

## 2018-11-01 RX ADMIN — IOHEXOL 100 ML: 350 INJECTION, SOLUTION INTRAVENOUS at 03:11

## 2018-11-01 RX ADMIN — OXYCODONE HYDROCHLORIDE 15 MG: 10 TABLET ORAL at 04:11

## 2018-11-01 RX ADMIN — CEFTRIAXONE SODIUM 1 G: 1 INJECTION, POWDER, FOR SOLUTION INTRAMUSCULAR; INTRAVENOUS at 05:11

## 2018-11-01 RX ADMIN — MORPHINE SULFATE 5 MG: 2 INJECTION, SOLUTION INTRAMUSCULAR; INTRAVENOUS at 04:11

## 2018-11-01 RX ADMIN — DIPHENHYDRAMINE HYDROCHLORIDE 25 MG: 25 CAPSULE ORAL at 02:11

## 2018-11-01 RX ADMIN — TAMSULOSIN HYDROCHLORIDE 0.4 MG: 0.4 CAPSULE ORAL at 11:11

## 2018-11-01 RX ADMIN — ONDANSETRON 4 MG: 2 INJECTION INTRAMUSCULAR; INTRAVENOUS at 02:11

## 2018-11-01 RX ADMIN — FAMOTIDINE 20 MG: 10 INJECTION, SOLUTION INTRAVENOUS at 09:11

## 2018-11-01 RX ADMIN — MORPHINE SULFATE 5 MG: 2 INJECTION, SOLUTION INTRAMUSCULAR; INTRAVENOUS at 02:11

## 2018-11-01 RX ADMIN — POTASSIUM CHLORIDE 40 MEQ: 1500 TABLET, EXTENDED RELEASE ORAL at 02:11

## 2018-11-01 RX ADMIN — OXYCODONE HYDROCHLORIDE 10 MG: 5 TABLET ORAL at 07:11

## 2018-11-01 RX ADMIN — ONDANSETRON 4 MG: 2 INJECTION INTRAMUSCULAR; INTRAVENOUS at 07:11

## 2018-11-01 RX ADMIN — SODIUM CHLORIDE 1000 ML: 0.9 INJECTION, SOLUTION INTRAVENOUS at 06:11

## 2018-11-01 RX ADMIN — SODIUM CHLORIDE: 0.9 INJECTION, SOLUTION INTRAVENOUS at 07:11

## 2018-11-01 RX ADMIN — MIDAZOLAM HYDROCHLORIDE 2 MG: 1 INJECTION, SOLUTION INTRAMUSCULAR; INTRAVENOUS at 07:11

## 2018-11-01 RX ADMIN — LIDOCAINE HYDROCHLORIDE 80 MG: 20 INJECTION, SOLUTION INTRAVENOUS at 07:11

## 2018-11-01 RX ADMIN — ACETAMINOPHEN 650 MG: 325 TABLET ORAL at 02:11

## 2018-11-01 RX ADMIN — POTASSIUM CHLORIDE 40 MEQ: 1500 TABLET, EXTENDED RELEASE ORAL at 08:11

## 2018-11-01 RX ADMIN — FENTANYL CITRATE 50 MCG: 50 INJECTION, SOLUTION INTRAMUSCULAR; INTRAVENOUS at 07:11

## 2018-11-01 RX ADMIN — FAMOTIDINE 20 MG: 10 INJECTION, SOLUTION INTRAVENOUS at 08:11

## 2018-11-01 RX ADMIN — SODIUM CHLORIDE: 0.9 INJECTION, SOLUTION INTRAVENOUS at 09:11

## 2018-11-01 RX ADMIN — LORAZEPAM 0.5 MG: 2 INJECTION, SOLUTION INTRAMUSCULAR; INTRAVENOUS at 02:11

## 2018-11-01 RX ADMIN — ACETAMINOPHEN 1000 MG: 10 INJECTION, SOLUTION INTRAVENOUS at 10:11

## 2018-11-01 RX ADMIN — OXYCODONE HYDROCHLORIDE 15 MG: 10 TABLET ORAL at 08:11

## 2018-11-01 RX ADMIN — HYDROMORPHONE HYDROCHLORIDE 1 MG: 1 INJECTION, SOLUTION INTRAMUSCULAR; INTRAVENOUS; SUBCUTANEOUS at 11:11

## 2018-11-01 RX ADMIN — DEXAMETHASONE SODIUM PHOSPHATE 4 MG: 4 INJECTION, SOLUTION INTRAMUSCULAR; INTRAVENOUS at 07:11

## 2018-11-01 RX ADMIN — Medication 800 MG: at 11:11

## 2018-11-01 RX ADMIN — POTASSIUM CHLORIDE 40 MEQ: 1500 TABLET, EXTENDED RELEASE ORAL at 06:11

## 2018-11-01 RX ADMIN — OXYCODONE HYDROCHLORIDE 15 MG: 10 TABLET ORAL at 12:11

## 2018-11-01 NOTE — ED NOTES
ED t o ED transfer, pt accepted by Dr. Blanca at [facility] [phone]. Spoke with Regine at referral center, will set up transport.

## 2018-11-01 NOTE — ED PROVIDER NOTES
Encounter Date: 11/1/2018    SCRIBE #1 NOTE: I, Arthur Loza, am scribing for, and in the presence of,  Dr. Vargas. I have scribed the entire note.       History     Chief Complaint   Patient presents with    Flank Pain     Left flank pain with N/V, fever since Tuesday AM. + dysuria and frequency.      Latisha Guzmán is a 33 y.o. female who  has a past medical history of Kidney stones and Mental disorder.    The patient presents to the ED due to left flank pain with nausea/vomiting for the past 2 days. Patient reports associated fever, dysuria, and urinary frequency. She reports a temp of 103 F earlier today. She states she has a prior history of similar symptoms x 3, and has a history of kidney infections and kidney stones. Her most recent similar episode was about 3 years ago, and notes she has not required any prior surgery or other intervention to pass the stones previously. Patient denies any recent trauma, abdominal pain, CP, SOB, or any other concerning symptoms. The patient is a smoker, about 1 pack every 5 days.      The history is provided by the patient.     Review of patient's allergies indicates:   Allergen Reactions    Toradol [ketorolac]     Tramadol Hives    Tramadol      Past Medical History:   Diagnosis Date    Kidney stones     Mental disorder     anxiety     Past Surgical History:   Procedure Laterality Date    APPENDECTOMY       History reviewed. No pertinent family history.  Social History     Tobacco Use    Smoking status: Current Every Day Smoker   Substance Use Topics    Alcohol use: No    Drug use: No     Review of Systems   Constitutional: Negative for chills and fever.   HENT: Negative for sore throat.    Respiratory: Negative for cough and shortness of breath.    Cardiovascular: Negative for chest pain.   Gastrointestinal: Positive for nausea and vomiting. Negative for abdominal distention, abdominal pain and constipation.   Genitourinary: Positive for dysuria, flank pain and  frequency. Negative for urgency.   Musculoskeletal: Negative for back pain, neck pain and neck stiffness.   Skin: Negative for rash and wound.   Neurological: Negative for syncope and weakness.   Hematological: Does not bruise/bleed easily.   Psychiatric/Behavioral: Negative for agitation, behavioral problems and confusion.     Physical Exam     Initial Vitals [11/01/18 0118]   BP Pulse Resp Temp SpO2   (!) 139/94 (!) 140 20 (!) 101 °F (38.3 °C) 99 %      MAP       --         Physical Exam    Nursing note and vitals reviewed.  Constitutional: She appears well-developed and well-nourished. She is not diaphoretic. No distress.   HENT:   Head: Normocephalic and atraumatic.   Mouth/Throat: Oropharynx is clear and moist.   Eyes: EOM are normal. Pupils are equal, round, and reactive to light.   Neck: No tracheal deviation present.   Cardiovascular: Regular rhythm, normal heart sounds and intact distal pulses.   Tachycardic, HR in the 110s   Pulmonary/Chest: Breath sounds normal. No stridor. No respiratory distress. She has no wheezes.   Abdominal: Soft. Bowel sounds are normal. She exhibits no distension and no mass. There is no tenderness.   Musculoskeletal: Normal range of motion. She exhibits tenderness. She exhibits no edema.   Left CVA TTP   Neurological: She is alert and oriented to person, place, and time. She has normal strength. No cranial nerve deficit or sensory deficit.   Skin: Skin is warm and dry. Capillary refill takes less than 2 seconds. No pallor.   Psychiatric: She has a normal mood and affect. Her behavior is normal. Thought content normal.       ED Course   Critical Care  Date/Time: 11/1/2018 6:50 AM  Performed by: Mark Vargas MD  Authorized by: Mark Vargas MD   Direct patient critical care time: 12 minutes  Additional history critical care time: 5 minutes  Ordering / reviewing critical care time: 5 minutes  Documentation critical care time: 8 minutes  Consulting other physicians critical care  time: 5 minutes  Consult with family critical care time: 6 minutes  Total critical care time (exclusive of procedural time) : 41 minutes  Critical care was necessary to treat or prevent imminent or life-threatening deterioration of the following conditions: pyelonephritis/infected kidney stone.  Critical care was time spent personally by me on the following activities: development of treatment plan with patient or surrogate, interpretation of cardiac output measurements, evaluation of patient's response to treatment, obtaining history from patient or surrogate, ordering and performing treatments and interventions, ordering and review of radiographic studies, ordering and review of laboratory studies, re-evaluation of patient's condition and review of old charts.        Labs Reviewed   URINALYSIS - Abnormal; Notable for the following components:       Result Value    Specific Gravity, UA >=1.030 (*)     Protein, UA 2+ (*)     Occult Blood UA 2+ (*)     Leukocytes, UA 2+ (*)     All other components within normal limits   CBC WITHOUT DIFFERENTIAL - Abnormal; Notable for the following components:    WBC 19.61 (*)     Hemoglobin 10.5 (*)     Hematocrit 33.5 (*)     MCV 80 (*)     MCH 24.9 (*)     MCHC 31.3 (*)     RDW 17.6 (*)     Platelets 434 (*)     MPV 8.5 (*)     All other components within normal limits   COMPREHENSIVE METABOLIC PANEL - Abnormal; Notable for the following components:    Sodium 133 (*)     Potassium 2.9 (*)     CO2 21 (*)     All other components within normal limits   URINALYSIS MICROSCOPIC - Abnormal; Notable for the following components:    RBC, UA 8 (*)     WBC, UA >100 (*)     Bacteria, UA Moderate (*)     All other components within normal limits   CULTURE, BLOOD   LIPASE   LACTIC ACID, PLASMA   POCT URINE PREGNANCY             Medical Decision Making:   Initial Assessment:   This is a 33 y.o. female with PMHx of kidney stones and recurrent UTI who presents with left flank pain associated  with nausea/vomiting/fever.   Will obtain labs, UA, and consider CT for further evaluation.  Differential Diagnosis:   Differential Diagnosis includes, but is not limited to:  Sepsis, bacteremia, UTI, pneumonia, cellulitis, abscess, indwelling line/catheter infection, cholecystitis, viral URI, gastroenteritis, viral syndrome, sinusitis, otitis media/externa, neoplasm, drug reaction, serotonin syndrome, intoxication/withdrawal syndrome.    Clinical Tests:   Lab Tests: Ordered and Reviewed  ED Management:  UA with >100 WBC, concerning for UTI/pyelonephritis.  CT revealed 0.5 cm stone. Will discuss with urology.    03:55  Spoke with Urology at [facility], who will accept the patient for transfer for further management.      Upon re-evaluation, the patient's status has remained stable.  At this time, I believe the patient should be transferred for further evaluation and management of infected kidney stone.    Dr. Blanca with Urology service was contacted and the case was discussed.     The patient and family were updated with test results, overall impression, and further plan of care. All questions were answered. The patient expressed understanding and agreed with the current plan.                          Clinical Impression:     1. Left nephrolithiasis    2. Hydronephrosis, unspecified hydronephrosis type    3. Pyelonephritis    4. Nausea and vomiting, intractability of vomiting not specified, unspecified vomiting type    5. Fever, unspecified fever cause        Disposition:   Disposition: Transferred       I, Dr. Mark Vargas, personally performed the services described in this documentation. All medical record entries made by the scribe were at my direction and in my presence.  I have reviewed the chart and agree that the record reflects my personal performance and is accurate and complete.     Mark Vargas MD.     Mark Vargas MD  11/07/18 0130

## 2018-11-01 NOTE — ANESTHESIA RELEASE NOTE
Anesthesia Release from PACU Note    Patient name: Latisha Guzmán    Procedure(s): Procedure(s) (LRB):  CYSTOSCOPY, WITH URETERAL STENT INSERTION (Left)    Anesthesia type: general    Post pain: adequate analgesia    Post assessment: no apparent complications    Last vitals:   Vitals:    11/01/18 0747   BP: 128/61   Pulse: (!) 130   Resp: (!) 24   Temp: 37.9 °C (100.3 °F)       Post vital signs: stable    Level of consciousness: alert     Nausea/Vomiting: no nausea/no vomiting    Complications: none    Airway Patency:  patent    Respiratory: unassisted    Cardiovascular: stable and blood pressure at baseline    Hydration: euvolemic

## 2018-11-01 NOTE — CARE UPDATE
RN Proactive Rounding Note  Time of Visit: 950    Admit Date: 2018  LOS: 0  Code Status: Full Code   Date of Visit: 2018  : 1985  Age: 33 y.o.  Sex: female  Race: White  Bed: 527/527 B:   MRN: [id number]  Was the patient discharged from an ICU this admission? no   Was the patient discharged from a PACU within last 24 hours?  no  Did the patient receive conscious sedation/general anesthesia in last 24 hours?  yes  Was the patient in the ED within the past 24 hours?  yes  Was the patient started on NIPPV within the past 24 hours?  no  Attending Physician: Teressa Mobley MD  Primary Service: Networked reference to record PCT       ASSESSMENT:     Abnormal Vital Signs: tachycardia with fever   Clinical Issues: Circulatory     INTERVENTIONS/ RECOMMENDATIONS:     Patient admitted overnight from [place] for cystoscopy related to ureteral stone.   Initially went to ED with fever and tachycardia. (HR 140s)   Went for cystoscopy this am, stent placed.   Seen on proactive rounds for tachycardia. HR 120s, last temp 100.3.   To be started on IV abx.   Otherwise, stable at this time.     Discussed plan of care with RNSheryl .    PHYSICIAN ESCALATION:     Yes/No  no    Orders received and case discussed with Dr. ACUNA .    Disposition: Remain in room 527B.    FOLLOW-UP/CONTINGENCY:     Call back the Rapid Response Nurse at [phone] for additional questions or concerns.

## 2018-11-01 NOTE — NURSING TRANSFER
Nursing Transfer Note      11/1/2018     Transfer From: [facility]    Transfer via stretcher    Transfer with n/a    Transported by pct    Medicines sent: n/a    Chart send with patient: Yes    Notified: spouse    Patient reassessed at: 11/01/2018 at 0845 (date, time)    Upon arrival to floor: patient oriented to room, call bell in reach and bed in lowest position

## 2018-11-01 NOTE — TELEPHONE ENCOUNTER
Left ureteroscopy, holmium laser litho, stent removal/exchange.     1 hour     2-3 weeks ( she is in the hospital now)

## 2018-11-01 NOTE — HPI
33 YOF with history of two acute stone episodes in childhood presents as transfer with left ureteral stone, pyelonephritis, and sepsis.      WBC 20, F to 103, tachycardia to 120s. Complains of left flank and abdominal pain, F/C/N/V.  No blood thinners, last meal three days ago.

## 2018-11-01 NOTE — ED NOTES
Patient identifiers checked and correct.  LOC: The patient is awake, alert and aware of environment with an appropriate affect, the patient is oriented x 4 and speaking appropriately.  APPEARANCE: Patient resting comfortably and in mild distress, patient is clean and well groomed, patient's clothing is properly fastened.  SKIN: The skin is warm and dry, color consistent with ethnicity, patient has normal skin turgor and moist mucus membranes, skin intact, no breakdown or bruising noted.  MUSCULOSKELETAL: Patient moving all extremities well, no obvious swelling or deformities noted.  RESPIRATORY: Airway is open and patent, respirations are spontaneous and even, patient has a normal effort and rate.  CARDIAC: Patient has a normal rate and rhythm, no periphreal edema noted, capillary refill < 3 seconds. Normal +2 pedal pulses present.  ABDOMEN: Soft and non tender to palpation, no distention noted.  NEUROLOGIC: Eyes open spontaneously, PERRL, behavior appropriate to situation, follows commands, facial expression symmetrical, bilateral hand grasp equal and even, purposeful motor response noted, normal sensation in all extremities.

## 2018-11-01 NOTE — ED PROVIDER NOTES
Encounter Date: 11/1/2018       History     Chief Complaint   Patient presents with    Nephrology transfer     Pt presents to ED via EMS as transfer from [place] for L side kidney stone and infection     33-year-old female transferred to our facility for Urology evaluation of an infected kidney stone.   Patient presented to outside facility with flank pain.  She was found to be febrile and tachycardic.  She had a leukocytosis of 20,000, urinalysis with greater than 100 white blood cells, and a 0.5 cm stone within the ureter.   She was given antibiotics and fluids at outside facility and transferred here.   Upon arrival she is still tachycardic and febrile at 103° F complaining of flank pain and nausea.          Review of patient's allergies indicates:   Allergen Reactions    Toradol [ketorolac]     Tramadol Hives    Tramadol      Past Medical History:   Diagnosis Date    Kidney stones     Mental disorder     anxiety     Past Surgical History:   Procedure Laterality Date    APPENDECTOMY       History reviewed. No pertinent family history.  Social History     Tobacco Use    Smoking status: Current Every Day Smoker    Smokeless tobacco: Never Used   Substance Use Topics    Alcohol use: No    Drug use: No     Review of Systems   Constitutional: Positive for fatigue and fever.   HENT: Negative for sore throat.    Respiratory: Negative for shortness of breath.    Cardiovascular: Negative for chest pain.   Gastrointestinal: Negative for nausea.   Genitourinary: Positive for decreased urine volume and flank pain. Negative for dysuria.   Musculoskeletal: Negative for back pain.   Skin: Negative for rash.   Neurological: Negative for weakness.   Hematological: Does not bruise/bleed easily.       Physical Exam     Initial Vitals [11/01/18 0539]   BP Pulse Resp Temp SpO2   (!) 158/80 (!) 128 18 (!) 103.2 °F (39.6 °C) (!) 94 %      MAP       --         Physical Exam    Constitutional: Vital signs are normal. She  appears well-developed and well-nourished. She appears distressed.   HENT:   Head: Normocephalic and atraumatic.   Eyes: Conjunctivae are normal.   Cardiovascular: Regular rhythm. Exam reveals no gallop and no friction rub.    No murmur heard.  Tachycardic   Abdominal: Soft. Normal appearance and bowel sounds are normal.   Flank pain   Musculoskeletal: Normal range of motion.   Neurological: She is alert and oriented to person, place, and time.   Skin: Skin is warm and intact.   Psychiatric: She has a normal mood and affect. Her speech is normal and behavior is normal. Cognition and memory are normal.         ED Course   Procedures  Labs Reviewed   URINALYSIS, REFLEX TO URINE CULTURE - Abnormal; Notable for the following components:       Result Value    Color, UA Red (*)     Appearance, UA Cloudy (*)     Specific Gravity, UA >=1.030 (*)     Protein, UA 2+ (*)     Occult Blood UA 3+ (*)     Leukocytes, UA 2+ (*)     All other components within normal limits    Narrative:     Preferred Collection Type->Urine, Catheterized   URINALYSIS MICROSCOPIC - Abnormal; Notable for the following components:    RBC, UA >100 (*)     WBC, UA 25 (*)     All other components within normal limits    Narrative:     Preferred Collection Type->Urine, Catheterized   GRAM STAIN   CULTURE, URINE          Imaging Results          SURG FL Surgery Fluoro Less Than 1 Hour (Final result)  Result time 11/01/18 07:55:07    Final result by Xuan Torres RN (11/01/18 07:55:07)                 Impression:    See OP Notes for results.             This procedure was auto-finalized by: Virtual Radiologist                 Narrative:    See OP Notes for results.                                  Medical Decision Making:   ED Management:  33-year-old female with infected kidney stone  Urology has been contacted  I will give more fluids, antibiotics and supportive care in the ED  Plan on admission to Urology for stent placement               Attending  Attestation:     Physician Attestation Statement for NP/PA:   I have conducted a face to face encounter with this patient in addition to the NP/PA, due to Medical Complexity                     Clinical Impression:   The primary encounter diagnosis was Kidney stone. Diagnoses of Left ureteral stone, Pyelonephritis, Sepsis, due to unspecified organism, and Ureteral stone were also pertinent to this visit.      Disposition:   Disposition: Admitted  Condition: Serious                        Gerhard Granados PA-C  11/01/18 2527

## 2018-11-01 NOTE — ANESTHESIA POSTPROCEDURE EVALUATION
Anesthesia Post Evaluation    Patient: Latisha Guzmán    Procedure(s) Performed: Procedure(s) (LRB):  CYSTOSCOPY, WITH URETERAL STENT INSERTION (Left)    Final Anesthesia Type: general  Patient location during evaluation: PACU  Patient participation: Yes- Able to Participate  Level of consciousness: awake  Post-procedure vital signs: reviewed and stable  Pain management: adequate  Airway patency: patent  PONV status at discharge: No PONV  Anesthetic complications: no      Cardiovascular status: stable  Respiratory status: unassisted  Hydration status: euvolemic  Follow-up not needed.        Visit Vitals  /61 (BP Location: Right arm, Patient Position: Lying)   Pulse (!) 130   Temp 37.9 °C (100.3 °F) (Temporal)   Resp (!) 24   LMP 11/01/2018   SpO2 95%       Pain/Jessy Score: Pain Assessment Performed: Yes (11/1/2018  7:47 AM)  Presence of Pain: non-verbal indicators absent (11/1/2018  7:47 AM)  Pain Rating Prior to Med Admin: 8 (11/1/2018  7:56 AM)  Jessy Score: 9 (11/1/2018  7:47 AM)

## 2018-11-01 NOTE — SUBJECTIVE & OBJECTIVE
Past Medical History:   Diagnosis Date    Kidney stones     Mental disorder     anxiety       Past Surgical History:   Procedure Laterality Date    APPENDECTOMY         Review of patient's allergies indicates:   Allergen Reactions    Toradol [ketorolac]     Tramadol Hives    Tramadol        Family History     None          Tobacco Use    Smoking status: Current Every Day Smoker    Smokeless tobacco: Never Used   Substance and Sexual Activity    Alcohol use: No    Drug use: No    Sexual activity: Not on file       Review of Systems    Objective:     Temp:  [101 °F (38.3 °C)-103.2 °F (39.6 °C)] 103.2 °F (39.6 °C)  Pulse:  [118-140] 128  Resp:  [18-26] 18  SpO2:  [94 %-99 %] 94 %  BP: (139-158)/(77-94) 158/80     There is no height or weight on file to calculate BMI.            Drains          None          Physical Exam    Significant Labs:    BMP:  Recent Labs   Lab 11/01/18  0151   *   K 2.9*      CO2 21*   BUN 14   CREATININE 0.9   CALCIUM 9.2       CBC:  Recent Labs   Lab 11/01/18  0151   WBC 19.61*   HGB 10.5*   HCT 33.5*   *       All pertinent labs results from the past 24 hours have been reviewed.    Significant Imaging:  CT: I have reviewed all results within the past 24 hours and my personal findings are:  Right kidney normal--no hydronephrosis, stones, or masses.  Left kidney with striated nephrogram, hydronephrosis to level of 5 mm left proximal ureteral stone at bottom of L3.  Hydroureter distal to stone as well, although no obvious distal obstruction.  Enhancemet of left proximal ureteral wall.  Bladder unremarkable.

## 2018-11-01 NOTE — CONSULTS
[facility]  Urology  Consult Note    Patient Name: Latisha Guzmán  MRN: [id number]  Admission Date: 11/1/2018  Hospital Length of Stay: 0   Code Status: Full Code   Attending Provider: Deonte Alejandro MD   Consulting Provider: Ahemt Aragon MD  Primary Care Physician: Debra Tiwari MD  Principal Problem:<principal problem not specified>    Inpatient consult to Urology  Consult performed by: Ahmet Aragon MD  Consult ordered by: Gerhard Granados PA-C          Subjective:     HPI:  33 YOF with history of two acute stone episodes in childhood presents as transfer with left ureteral stone, pyelonephritis, and sepsis.      WBC 20, F to 103, tachycardia to 120s. Complains of left flank and abdominal pain, F/C/N/V.  No blood thinners, last meal three days ago.     Past Medical History:   Diagnosis Date    Kidney stones     Mental disorder     anxiety       Past Surgical History:   Procedure Laterality Date    APPENDECTOMY         Review of patient's allergies indicates:   Allergen Reactions    Toradol [ketorolac]     Tramadol Hives    Tramadol        Family History     None          Tobacco Use    Smoking status: Current Every Day Smoker    Smokeless tobacco: Never Used   Substance and Sexual Activity    Alcohol use: No    Drug use: No    Sexual activity: Not on file       Review of Systems   Constitutional: Positive for chills and fever.   HENT: Negative for ear pain and hearing loss.    Eyes: Negative for photophobia and pain.   Respiratory: Negative for cough and shortness of breath.    Cardiovascular: Negative for chest pain and palpitations.   Gastrointestinal: Positive for nausea and vomiting.   Genitourinary: Positive for flank pain. Negative for hematuria.   Neurological: Negative for dizziness and headaches.   Psychiatric/Behavioral: Negative for agitation and confusion.       Objective:     Temp:  [101 °F (38.3 °C)-103.2 °F (39.6 °C)] 103.2 °F (39.6 °C)  Pulse:  [118-140]  128  Resp:  [18-26] 18  SpO2:  [94 %-99 %] 94 %  BP: (139-158)/(77-94) 158/80     There is no height or weight on file to calculate BMI.            Drains          None          Physical Exam   Constitutional: She is oriented to person, place, and time. She appears distressed.   HENT:   Head: Normocephalic and atraumatic.   Cardiovascular:  Tachycardia present.    Pulmonary/Chest: Effort normal. No respiratory distress.   Abdominal: Soft. She exhibits no distension. There is tenderness. There is CVA tenderness (left). There is no rebound.   Left sided abdominal tenderness.  Appendectomy scar.    Neurological: She is alert and oriented to person, place, and time.   Skin: Skin is warm. She is not diaphoretic.     Psychiatric: She has a normal mood and affect.       Significant Labs:    BMP:  Recent Labs   Lab 11/01/18  0151   *   K 2.9*      CO2 21*   BUN 14   CREATININE 0.9   CALCIUM 9.2       CBC:  Recent Labs   Lab 11/01/18  0151   WBC 19.61*   HGB 10.5*   HCT 33.5*   *       All pertinent labs results from the past 24 hours have been reviewed.    Significant Imaging:  CT: I have reviewed all results within the past 24 hours and my personal findings are:  Right kidney normal--no hydronephrosis, stones, or masses.  Left kidney with striated nephrogram, hydronephrosis to level of 5 mm left proximal ureteral stone at bottom of L3.  Hydroureter distal to stone as well, although no obvious distal obstruction.  Enhancemet of left proximal ureteral wall.  Bladder unremarkable.                              Assessment and Plan:     Pyelonephritis      - admit to urology  - left ureteral stone, pyelonephritis, sepsis  - strict Is/Os  - NPO  - IVF  - pain and nausea control -- allergy to toradol, tramadol   - daily labs   - no further imaging at this time  - TEDs/SCDs/ GI PPx    Dispo:  To OR emergently for left ureteral stent insertion     Left ureteral stone    See below     Sepsis    See below          VTE Risk Mitigation (From admission, onward)        Ordered     Place PRAVEEN hose  Until discontinued      11/01/18 0628     Place sequential compression device  Until discontinued      11/01/18 0628     IP VTE LOW RISK PATIENT  Once      11/01/18 0628          Thank you for your consult. I will follow-up with patient. Please contact us if you have any additional questions.    Ahmet Aragon MD  Urology  [facility]

## 2018-11-01 NOTE — SUBJECTIVE & OBJECTIVE
Past Medical History:   Diagnosis Date    Kidney stones     Mental disorder     anxiety       Past Surgical History:   Procedure Laterality Date    APPENDECTOMY         Review of patient's allergies indicates:   Allergen Reactions    Toradol [ketorolac]     Tramadol Hives    Tramadol        Family History     None          Tobacco Use    Smoking status: Current Every Day Smoker    Smokeless tobacco: Never Used   Substance and Sexual Activity    Alcohol use: No    Drug use: No    Sexual activity: Not on file       Review of Systems   Constitutional: Positive for chills and fever.   HENT: Negative for ear pain and hearing loss.    Eyes: Negative for photophobia and pain.   Respiratory: Negative for cough and shortness of breath.    Cardiovascular: Negative for chest pain and palpitations.   Gastrointestinal: Positive for nausea and vomiting.   Genitourinary: Positive for flank pain. Negative for hematuria.   Neurological: Negative for dizziness and headaches.   Psychiatric/Behavioral: Negative for agitation and confusion.       Objective:     Temp:  [101 °F (38.3 °C)-103.2 °F (39.6 °C)] 103.2 °F (39.6 °C)  Pulse:  [118-140] 128  Resp:  [18-26] 18  SpO2:  [94 %-99 %] 94 %  BP: (139-158)/(77-94) 158/80     There is no height or weight on file to calculate BMI.            Drains          None          Physical Exam   Constitutional: She is oriented to person, place, and time. She appears distressed.   HENT:   Head: Normocephalic and atraumatic.   Cardiovascular:  Tachycardia present.    Pulmonary/Chest: Effort normal. No respiratory distress.   Abdominal: Soft. She exhibits no distension. There is tenderness. There is CVA tenderness (left). There is no rebound.   Left sided abdominal tenderness.  Appendectomy scar.    Neurological: She is alert and oriented to person, place, and time.   Skin: Skin is warm. She is not diaphoretic.     Psychiatric: She has a normal mood and affect.       Significant  Labs:    BMP:  Recent Labs   Lab 11/01/18  0151   *   K 2.9*      CO2 21*   BUN 14   CREATININE 0.9   CALCIUM 9.2       CBC:  Recent Labs   Lab 11/01/18 0151   WBC 19.61*   HGB 10.5*   HCT 33.5*   *       All pertinent labs results from the past 24 hours have been reviewed.    Significant Imaging:  CT: I have reviewed all results within the past 24 hours and my personal findings are:  Right kidney normal--no hydronephrosis, stones, or masses.  Left kidney with striated nephrogram, hydronephrosis to level of 5 mm left proximal ureteral stone at bottom of L3.  Hydroureter distal to stone as well, although no obvious distal obstruction.  Enhancemet of left proximal ureteral wall.  Bladder unremarkable.

## 2018-11-01 NOTE — ASSESSMENT & PLAN NOTE
- admit to urology  - left ureteral stone, pyelonephritis, sepsis  - strict Is/Os  - NPO  - IVF  - pain and nausea control -- allergy to toradol, tramadol   - daily labs   - no further imaging at this time  - TEDs/SCDs/ GI PPx    Dispo:  To OR emergently for left ureteral stent insertion

## 2018-11-01 NOTE — OP NOTE
[facility] Note    Date: 11/01/2018    Pre-Op Diagnosis:   1. Left ureteral stone  2. Urosepsis   3. UTI    Patient Active Problem List   Diagnosis    Back pain    Kidney stone    Sepsis    Left ureteral stone    Pyelonephritis       Op Diagnosis: same    Procedure(s) Performed:    1. Cystoscopy with left ureteral JJ stent placement  2. Fluoroscopy < 1 h    Specimen(s): none    Staff Surgeon: Teressa Mobley MD    Assistant Surgeon: Dee Curiel MD    Anesthesia: Monitored Local Anesthesia with Sedation    Indications: Latisha Guzmán is a 33 y.o. female with left 5 mm proximal ureteral stone and a UTI. She presented to the ED febrile and tachycardic.    Findings:    - stone not definitively seen on fluoro  - no pyonephrosis noted    Estimated Blood Loss: min    Drains:    1. 6 [place] x 24 cm left JJ ureteral stent without strings  2. 18 Fr johnson catheter    Procedure in Detail:  After risks, benefits and possible complications of the procedure were explained, the patient elected to undergo the procedure and informed consent was obtained. All questions were answered in the london-operative area. The patient was transferred to the cystoscopy suite and placed on the fluoroscopy table in the supine position.  SCDs were applied and working. Time out was performed, london-procedural antibiotics were given. Anesthesia was administered.  After adequate anesthesia the patient was placed in dorsal lithotomy position and prepped and draped in the usual sterile fashion.     A rigid cystoscope in a 22 Fr sheath was introduced into the patients bladder per urethra. This passed easily.  The entire urethra was visualized and revealed no strictures or masses.  Cystoscopy was performed which showed the right and left ureteral orifices in the normal anatomic position.      Our attention was turned to the patient's left ureteral orifice.  A motion wire was advanced up the left ureteral orifice to the  level of the expected renal pelvis.  This was confirmed using fluoroscopy.     We then passed a 6 Fr x 24 cm JJ ureteral stent without strings over the wire to the level of the renal pelvis under direct vision as well as flouroscopy. The guide wire was removed.  A 180 degree coil was observed in the renal pelvis as well as the bladder using fluoroscopy.  A 180 degree coil was also seen using direct visualization in the bladder.     A 16 fr johnson was placed with 10 cc in the balloon.     The patient tolerated the procedure well and was transferred to the recovery room in stable condition.      Disposition: The patient will be admitted for IV antibiotics and continued monitoring.      Dee Curiel MD

## 2018-11-01 NOTE — TRANSFER OF CARE
Anesthesia Transfer of Care Note    Patient: Latisha Guzmán    Procedure(s) Performed: Procedure(s) (LRB):  CYSTOSCOPY, WITH URETERAL STENT INSERTION (Left)    Patient location: PACU    Anesthesia Type: general    Transport from OR: Transported from OR on 6-10 L/min O2 by face mask with adequate spontaneous ventilation    Post pain: adequate analgesia    Post assessment: no apparent anesthetic complications    Post vital signs: stable    Level of consciousness: awake    Nausea/Vomiting: no nausea/vomiting    Complications: none    Transfer of care protocol was followed      Last vitals:   Visit Vitals  /61 (BP Location: Right arm, Patient Position: Lying)   Pulse (!) 130   Temp 37.9 °C (100.3 °F) (Temporal)   Resp (!) 24   LMP 11/01/2018   SpO2 95%

## 2018-11-01 NOTE — ANESTHESIA PREPROCEDURE EVALUATION
[facility]  Anesthesia Pre-Operative Evaluation         Patient Name: Latisha Guzmán  YOB: 1985  MRN: [id number]    SUBJECTIVE:     Pre-operative evaluation for Procedure(s) (LRB):  CYSTOSCOPY, WITH URETERAL STENT INSERTION (Left)     11/01/2018    Latisha Guzmán is a 33 y.o. female w/ a significant PMHx of Kidney stones and Mental disorder. The patient presents to the ED due to left flank pain with nausea/vomiting for the past 2 days. On arrival to the ED, patient has a temp of 101 F and HR of 140. She has a history of kidney infections and kidney stones. Admitted for left kidney stone with pyelonephritis. The patient is a smoker, about 1 pack per 5 days.   She states that she is currently nauseated and frequently dry heaves.    Last NPO: Patient states she last had a sip of water and 2 bites of solid food late last night approximately 10pm, and has not eaten since.  Access: Left AC, 18g    Notable labs: WBC 20k, K 2.9    Patient now presents for the above procedure(s).      LDA: None documented.     Prev airway: None documented.    Drips: None documented.      Patient Active Problem List   Diagnosis    Back pain       Review of patient's allergies indicates:   Allergen Reactions    Toradol [ketorolac]     Tramadol Hives    Tramadol        Current Inpatient Medications:   HYDROmorphone  1 mg Intravenous ED 1 Time       No current facility-administered medications on file prior to encounter.      Current Outpatient Medications on File Prior to Encounter   Medication Sig Dispense Refill    ibuprofen (ADVIL,MOTRIN) 600 MG tablet Take 1 tablet (600 mg total) by mouth every 6 (six) hours as needed for Pain. 20 tablet 0       Past Surgical History:   Procedure Laterality Date    APPENDECTOMY         Social History     Socioeconomic History    Marital status:      Spouse name: Not on file    Number of children: Not on file    Years of education: Not on file    Highest  education level: Not on file   Social Needs    Financial resource strain: Not on file    Food insecurity - worry: Not on file    Food insecurity - inability: Not on file    Transportation needs - medical: Not on file    Transportation needs - non-medical: Not on file   Occupational History    Not on file   Tobacco Use    Smoking status: Current Every Day Smoker    Smokeless tobacco: Never Used   Substance and Sexual Activity    Alcohol use: No    Drug use: No    Sexual activity: Not on file   Other Topics Concern    Not on file   Social History Narrative    ** Merged History Encounter **            OBJECTIVE:     Vital Signs Range (Last 24H):  Temp:  [38.3 °C (101 °F)-39.6 °C (103.2 °F)]   Pulse:  [118-140]   Resp:  [18-26]   BP: (139-158)/(77-94)   SpO2:  [94 %-99 %]       Significant Labs:  Lab Results   Component Value Date    WBC 19.61 (H) 11/01/2018    HGB 10.5 (L) 11/01/2018    HCT 33.5 (L) 11/01/2018     (H) 11/01/2018    ALT 11 11/01/2018    AST 16 11/01/2018     (L) 11/01/2018    K 2.9 (L) 11/01/2018     11/01/2018    CREATININE 0.9 11/01/2018    BUN 14 11/01/2018    CO2 21 (L) 11/01/2018       Diagnostic Studies:   CT Abd Pelvis  Impression     1. Mild left hydroureteronephrosis secondary to a 0.5 cm calculus in the mid left ureter with regions of abnormal left renal parenchymal hypoenhancement concerning for associated pyelonephritis as detailed above.  More focal region of abnormal hypoattenuation in the superior pole of the left kidney, possible developing abscess not excluded.  2. Left renal cyst and non-obstructing punctate nephrolith in the superior pole of the left kidney.         EKG: No recent studies available.    2D ECHO:  No results found for this or any previous visit.      ASSESSMENT/PLAN:       Anesthesia Evaluation    I have reviewed the Patient Summary Reports.     I have reviewed the Medications.     Review of Systems  Anesthesia Hx:  No problems with  previous Anesthesia  History of prior surgery of interest to airway management or planning:  Denies Personal Hx of Anesthesia complications.   Social:  Smoker    Cardiovascular:  Cardiovascular Normal     Pulmonary:  Pulmonary Normal    Renal/:   renal calculi    Neurological:  Neurology Normal    Endocrine:  Endocrine Normal        Physical Exam  General:  Well nourished    Airway/Jaw/Neck:  Airway Findings: Mouth Opening: Normal Tongue: Normal  General Airway Assessment: Adult  Mallampati: I  TM Distance: 4 - 6 cm  Jaw/Neck Findings:  Neck ROM: Normal ROM  Neck Findings: Normal    Eyes/Ears/Nose:  EYES/EARS/NOSE FINDINGS: Normal   Dental:  Dental Findings: Periodontal disease, Severe    Chest/Lungs:  Chest/Lungs Findings: Clear to auscultation, Normal Respiratory Rate     Heart/Vascular:  Heart Findings: Rate: Tachycardia  Rhythm: Regular Rhythm      Musculoskeletal:  Musculoskeletal Findings: Normal    Mental Status:  Mental Status Findings:  Cooperative, Alert and Oriented         Anesthesia Plan  Type of Anesthesia, risks & benefits discussed:  Anesthesia Type:  general  Patient's Preference:   Intra-op Monitoring Plan: standard ASA monitors  Intra-op Monitoring Plan Comments:   Post Op Pain Control Plan: multimodal analgesia, IV/PO Opioids PRN and per primary service following discharge from PACU  Post Op Pain Control Plan Comments:   Induction:   IV  Beta Blocker:  Patient is not currently on a Beta-Blocker (No further documentation required).       Informed Consent: Patient understands risks and agrees with Anesthesia plan.  Questions answered. Anesthesia consent signed with patient.  ASA Score: 3  emergent   Day of Surgery Review of History & Physical:    H&P update referred to the surgeon.         Ready For Surgery From Anesthesia Perspective.

## 2018-11-02 VITALS
SYSTOLIC BLOOD PRESSURE: 142 MMHG | DIASTOLIC BLOOD PRESSURE: 74 MMHG | TEMPERATURE: 97 F | RESPIRATION RATE: 20 BRPM | OXYGEN SATURATION: 100 % | HEART RATE: 88 BPM

## 2018-11-02 LAB
ANION GAP SERPL CALC-SCNC: 7 MMOL/L
BACTERIA UR CULT: NORMAL
BASOPHILS # BLD AUTO: 0.03 K/UL
BASOPHILS NFR BLD: 0.2 %
BUN SERPL-MCNC: 15 MG/DL
CALCIUM SERPL-MCNC: 8.4 MG/DL
CHLORIDE SERPL-SCNC: 113 MMOL/L
CO2 SERPL-SCNC: 18 MMOL/L
CREAT SERPL-MCNC: 0.7 MG/DL
DIFFERENTIAL METHOD: ABNORMAL
EOSINOPHIL # BLD AUTO: 0.1 K/UL
EOSINOPHIL NFR BLD: 0.4 %
ERYTHROCYTE [DISTWIDTH] IN BLOOD BY AUTOMATED COUNT: 18.3 %
EST. GFR  (AFRICAN AMERICAN): >60 ML/MIN/1.73 M^2
EST. GFR  (NON AFRICAN AMERICAN): >60 ML/MIN/1.73 M^2
GLUCOSE SERPL-MCNC: 136 MG/DL
HCT VFR BLD AUTO: 27.4 %
HGB BLD-MCNC: 8.7 G/DL
IMM GRANULOCYTES # BLD AUTO: 0.08 K/UL
IMM GRANULOCYTES NFR BLD AUTO: 0.4 %
LYMPHOCYTES # BLD AUTO: 2.5 K/UL
LYMPHOCYTES NFR BLD: 14 %
MAGNESIUM SERPL-MCNC: 1.9 MG/DL
MCH RBC QN AUTO: 25.2 PG
MCHC RBC AUTO-ENTMCNC: 31.8 G/DL
MCV RBC AUTO: 79 FL
MONOCYTES # BLD AUTO: 1 K/UL
MONOCYTES NFR BLD: 5.8 %
NEUTROPHILS # BLD AUTO: 14.1 K/UL
NEUTROPHILS NFR BLD: 79.2 %
NRBC BLD-RTO: 0 /100 WBC
PHOSPHATE SERPL-MCNC: 2.3 MG/DL
PLATELET # BLD AUTO: 301 K/UL
PMV BLD AUTO: 9.3 FL
POTASSIUM SERPL-SCNC: 3.9 MMOL/L
RBC # BLD AUTO: 3.45 M/UL
SODIUM SERPL-SCNC: 138 MMOL/L
WBC # BLD AUTO: 17.85 K/UL

## 2018-11-02 PROCEDURE — 25000003 PHARM REV CODE 250: Performed by: STUDENT IN AN ORGANIZED HEALTH CARE EDUCATION/TRAINING PROGRAM

## 2018-11-02 PROCEDURE — 63600175 PHARM REV CODE 636 W HCPCS: Performed by: STUDENT IN AN ORGANIZED HEALTH CARE EDUCATION/TRAINING PROGRAM

## 2018-11-02 PROCEDURE — 80048 BASIC METABOLIC PNL TOTAL CA: CPT

## 2018-11-02 PROCEDURE — S0028 INJECTION, FAMOTIDINE, 20 MG: HCPCS | Performed by: STUDENT IN AN ORGANIZED HEALTH CARE EDUCATION/TRAINING PROGRAM

## 2018-11-02 PROCEDURE — 36415 COLL VENOUS BLD VENIPUNCTURE: CPT

## 2018-11-02 PROCEDURE — 85025 COMPLETE CBC W/AUTO DIFF WBC: CPT

## 2018-11-02 PROCEDURE — 83735 ASSAY OF MAGNESIUM: CPT

## 2018-11-02 PROCEDURE — 84100 ASSAY OF PHOSPHORUS: CPT

## 2018-11-02 RX ORDER — OXYCODONE AND ACETAMINOPHEN 5; 325 MG/1; MG/1
1 TABLET ORAL EVERY 6 HOURS PRN
Qty: 8 TABLET | Refills: 0 | Status: SHIPPED | OUTPATIENT
Start: 2018-11-02 | End: 2022-05-21 | Stop reason: CLARIF

## 2018-11-02 RX ORDER — SODIUM,POTASSIUM PHOSPHATES 280-250MG
1 POWDER IN PACKET (EA) ORAL EVERY 6 HOURS
Status: DISCONTINUED | OUTPATIENT
Start: 2018-11-02 | End: 2018-11-02 | Stop reason: HOSPADM

## 2018-11-02 RX ORDER — TAMSULOSIN HYDROCHLORIDE 0.4 MG/1
0.4 CAPSULE ORAL NIGHTLY
Qty: 30 CAPSULE | Refills: 0 | Status: ON HOLD | OUTPATIENT
Start: 2018-11-02 | End: 2018-11-12 | Stop reason: SDUPTHER

## 2018-11-02 RX ORDER — CEFDINIR 300 MG/1
300 CAPSULE ORAL 2 TIMES DAILY
Qty: 28 CAPSULE | Refills: 0 | Status: SHIPPED | OUTPATIENT
Start: 2018-11-02 | End: 2018-11-16

## 2018-11-02 RX ORDER — OXYBUTYNIN CHLORIDE 5 MG/1
5 TABLET ORAL 3 TIMES DAILY PRN
Qty: 30 TABLET | Refills: 0 | Status: SHIPPED | OUTPATIENT
Start: 2018-11-02 | End: 2022-05-21 | Stop reason: CLARIF

## 2018-11-02 RX ADMIN — OXYCODONE HYDROCHLORIDE 15 MG: 10 TABLET ORAL at 01:11

## 2018-11-02 RX ADMIN — Medication 800 MG: at 04:11

## 2018-11-02 RX ADMIN — FAMOTIDINE 20 MG: 10 INJECTION, SOLUTION INTRAVENOUS at 01:11

## 2018-11-02 RX ADMIN — GENTAMICIN SULFATE 240 MG: 40 INJECTION, SOLUTION INTRAMUSCULAR; INTRAVENOUS at 05:11

## 2018-11-02 RX ADMIN — CEFTRIAXONE 2 G: 2 INJECTION, SOLUTION INTRAVENOUS at 05:11

## 2018-11-02 RX ADMIN — OXYCODONE HYDROCHLORIDE 15 MG: 10 TABLET ORAL at 05:11

## 2018-11-02 RX ADMIN — OXYCODONE HYDROCHLORIDE 15 MG: 10 TABLET ORAL at 09:11

## 2018-11-02 RX ADMIN — ACETAMINOPHEN 1000 MG: 10 INJECTION, SOLUTION INTRAVENOUS at 04:11

## 2018-11-02 RX ADMIN — DIBASIC SODIUM PHOSPHATE, MONOBASIC POTASSIUM PHOSPHATE AND MONOBASIC SODIUM PHOSPHATE 1 PACKET: 852; 155; 130 TABLET ORAL at 08:11

## 2018-11-02 RX ADMIN — Medication 800 MG: at 01:11

## 2018-11-02 NOTE — PLAN OF CARE
Problem: Fall Risk (Adult)  Intervention: Patient Rounds  Pt resting in chair, up ad sharri. Tolerating PO intake. Euceda intact. Patient states adequate pain control with current pain med reg. States understanding of plan of care. Denies needs at present. Safety and fall precautions maintained.

## 2018-11-02 NOTE — ASSESSMENT & PLAN NOTE
- continue broad spectrum IV abx  - f/u UCx   - strict Is/Os  - regular diet   - IVF  - pain and nausea control -- allergy to toradol, tramadol   - daily labs   - drains:    - maintain johnson for now   - no further imaging at this time  - TEDs/SCDs/ GI PPx    Dispo:  Pending UCx and transition to PO abx resolution of tachycardia

## 2018-11-02 NOTE — PROGRESS NOTES
[facility]  Urology  Progress Note    Patient Name: Latisha Guzmán  MRN: [id number]  Admission Date: 11/1/2018  Hospital Length of Stay: 1 days  Code Status: Full Code   Attending Provider: Teressa Mobley MD   Primary Care Physician: Debra Tiwari MD    Subjective:     HPI:  33 YOF with history of two acute stone episodes in childhood presents as transfer with left ureteral stone, pyelonephritis, and sepsis.      WBC 20, F to 103, tachycardia to 120s. Complains of left flank and abdominal pain, F/C/N/V.  No blood thinners, last meal three days ago.     Interval History:     HEIDI  Tmax 100.3 yesterday morning at 0745  Remains intermittently tachycardic   Still complains of some left flank pain  No F/C    Review of Systems  Objective:     Temp:  [96.5 °F (35.8 °C)-100.3 °F (37.9 °C)] 96.5 °F (35.8 °C)  Pulse:  [] 88  Resp:  [16-24] 16  SpO2:  [94 %-99 %] 98 %  BP: (106-141)/(57-86) 106/57     There is no height or weight on file to calculate BMI.            Drains     Drain                 Urethral Catheter 11/01/18 0700 1 day                Physical Exam   Constitutional: She is oriented to person, place, and time. No distress.   HENT:   Head: Normocephalic and atraumatic.   Cardiovascular:  Tachycardia present.    Pulmonary/Chest: Effort normal. No respiratory distress.   Abdominal: Soft. She exhibits no distension. There is tenderness. There is CVA tenderness (left). There is no rebound.   Left sided abdominal tenderness.  Appendectomy scar.    Neurological: She is alert and oriented to person, place, and time.   Skin: Skin is warm. She is not diaphoretic.     Psychiatric: She has a normal mood and affect.       Significant Labs:    BMP:  Recent Labs   Lab 11/01/18 0151 11/02/18  0541   * 138   K 2.9* 3.9    113*   CO2 21* 18*   BUN 14 15   CREATININE 0.9 0.7   CALCIUM 9.2 8.4*       CBC:   Recent Labs   Lab 11/01/18 0151 11/02/18  0541   WBC 19.61* 17.85*   HGB 10.5*  8.7*   HCT 33.5* 27.4*   * 301       All pertinent labs results from the past 24 hours have been reviewed.    Significant Imaging:  All pertinent imaging results/findings from the past 24 hours have been reviewed.                  Assessment/Plan:     Pyelonephritis      - continue broad spectrum IV abx  - f/u UCx   - strict Is/Os  - regular diet   - IVF  - pain and nausea control -- allergy to toradol, tramadol   - daily labs   - drains:    - maintain johnson for now   - no further imaging at this time  - TEDs/SCDs/ GI PPx    Dispo:  Pending UCx and transition to PO abx resolution of tachycardia     Left ureteral stone    Will need outpatient ureteroscopy after adequate abx treatment course     Sepsis    See below         VTE Risk Mitigation (From admission, onward)        Ordered     Place PRAVEEN hose  Until discontinued      11/01/18 0628     Place sequential compression device  Until discontinued      11/01/18 0628     IP VTE LOW RISK PATIENT  Once      11/01/18 0628          Ahmet Aragon MD  Urology  [facility]

## 2018-11-02 NOTE — SUBJECTIVE & OBJECTIVE
Interval History:     HEIDI  Tmax 100.3 yesterday morning at 0745  Remains intermittently tachycardic   Still complains of some left flank pain  No F/C    Review of Systems  Objective:     Temp:  [96.5 °F (35.8 °C)-100.3 °F (37.9 °C)] 96.5 °F (35.8 °C)  Pulse:  [] 88  Resp:  [16-24] 16  SpO2:  [94 %-99 %] 98 %  BP: (106-141)/(57-86) 106/57     There is no height or weight on file to calculate BMI.            Drains     Drain                 Urethral Catheter 11/01/18 0700 1 day                Physical Exam   Constitutional: She is oriented to person, place, and time. No distress.   HENT:   Head: Normocephalic and atraumatic.   Cardiovascular:  Tachycardia present.    Pulmonary/Chest: Effort normal. No respiratory distress.   Abdominal: Soft. She exhibits no distension. There is tenderness. There is CVA tenderness (left). There is no rebound.   Left sided abdominal tenderness.  Appendectomy scar.    Neurological: She is alert and oriented to person, place, and time.   Skin: Skin is warm. She is not diaphoretic.     Psychiatric: She has a normal mood and affect.       Significant Labs:    BMP:  Recent Labs   Lab 11/01/18  0151 11/02/18  0541   * 138   K 2.9* 3.9    113*   CO2 21* 18*   BUN 14 15   CREATININE 0.9 0.7   CALCIUM 9.2 8.4*       CBC:   Recent Labs   Lab 11/01/18  0151 11/02/18  0541   WBC 19.61* 17.85*   HGB 10.5* 8.7*   HCT 33.5* 27.4*   * 301       All pertinent labs results from the past 24 hours have been reviewed.    Significant Imaging:  All pertinent imaging results/findings from the past 24 hours have been reviewed.

## 2018-11-02 NOTE — DISCHARGE SUMMARY
[facility]  Urology  Discharge Summary      Patient Name: Latisha Guzmán  MRN: [id number]  Admission Date: 11/1/2018  Hospital Length of Stay: 1 days  Discharge Date and Time:  11/02/2018 4:01 PM  Attending Physician: Teressa Mobley MD   Discharging Provider: Oneal Ramey MD  Primary Care Physician: Debra Tiwari MD    HPI:   33 YOF with history of two acute stone episodes in childhood presents as transfer with left ureteral stone, pyelonephritis, and sepsis.      WBC 20, F to 103, tachycardia to 120s. Complains of left flank and abdominal pain, F/C/N/V.  No blood thinners, last meal three days ago.     Procedure(s) (LRB):  CYSTOSCOPY, WITH URETERAL STENT INSERTION (Left)     Indwelling Lines/Drains at time of discharge:   Lines/Drains/Airways     Drain                 Urethral Catheter 11/01/18 0700 1 day                Hospital Course (synopsis of major diagnoses, care, treatment, and services provided during the course of the hospital stay): Patient was admitted on 11/1. She underwent cystoscopy with left ureteral JJ stent placement without strings on 11/1. She tolerated the procedure well and was transferred to the floor from PACU. She remained intermittently tachycardic in the post-op period. She was febrile to 103.2 early morning 11/1 but remained afebrile the remainder of her hospitalization. She tolerated a regular diet and her pain was controlled. She was deemed safe for discharge home on 11/2. She was discharged home with 14 days of cefdinir.    Consults:   Consults (From admission, onward)        Status Ordering Provider     Inpatient consult to Urology  Once     Provider:  (Not yet assigned)    Completed TIAGO GAGNON          Significant Diagnostic Studies: See chart review    Pending Diagnostic Studies:     None          Final Active Diagnoses:    Diagnosis Date Noted POA    PRINCIPAL PROBLEM:  Sepsis [A41.9] 11/01/2018 Yes    Kidney stone [N20.0] 11/01/2018 Yes     Left ureteral stone [N20.1] 11/01/2018 Yes    Pyelonephritis [N12] 11/01/2018 Yes      Problems Resolved During this Admission:         Discharged Condition: good    Disposition: Home or Self Care    Follow Up:  Follow-up Information     Teressa Mobley MD In 2 weeks.    Specialty:  Urology  Why:  Post-op follow-up appointment.  Contact information:  [employer]  [facility] [address]  [phone]                 Patient Instructions:      Diet Adult Regular     Notify your health care provider if you experience any of the following:  temperature >100.4     Notify your health care provider if you experience any of the following:  persistent nausea and vomiting or diarrhea     Notify your health care provider if you experience any of the following:  severe uncontrolled pain     Notify your health care provider if you experience any of the following:  redness, tenderness, or signs of infection (pain, swelling, redness, odor or green/yellow discharge around incision site)     Notify your health care provider if you experience any of the following:  difficulty breathing or increased cough     Notify your health care provider if you experience any of the following:  severe persistent headache     Notify your health care provider if you experience any of the following:  worsening rash     Notify your health care provider if you experience any of the following:  persistent dizziness, light-headedness, or visual disturbances     Notify your health care provider if you experience any of the following:  increased confusion or weakness     Notify your health care provider if you experience any of the following:     No driving until:   Order Comments: Off narcotics     Activity as tolerated     Medications:  Reconciled Home Medications:      Medication List      START taking these medications    cefdinir 300 MG capsule  Commonly known as:  OMNICEF  Take 1 capsule (300 mg total) by mouth 2 (two) times daily. for 14  days     oxybutynin 5 MG Tab  Commonly known as:  DITROPAN  Take 1 tablet (5 mg total) by mouth 3 (three) times daily as needed.     oxyCODONE-acetaminophen 5-325 mg per tablet  Commonly known as:  PERCOCET  Take 1 tablet by mouth every 6 (six) hours as needed.     tamsulosin 0.4 mg Cap  Commonly known as:  FLOMAX  Take 1 capsule (0.4 mg total) by mouth every evening.        CONTINUE taking these medications    ibuprofen 600 MG tablet  Commonly known as:  ADVIL,MOTRIN  Take 1 tablet (600 mg total) by mouth every 6 (six) hours as needed for Pain.            Time spent on the discharge of patient: 20 minutes    TIFFANIE Ramey MD  Urology  [facility]

## 2018-11-02 NOTE — H&P (VIEW-ONLY)
[facility]  Urology  Progress Note    Patient Name: Latisha Guzmán  MRN: [id number]  Admission Date: 11/1/2018  Hospital Length of Stay: 1 days  Code Status: Full Code   Attending Provider: Teressa Mobley MD   Primary Care Physician: Debra Tiwari MD    Subjective:     HPI:  33 YOF with history of two acute stone episodes in childhood presents as transfer with left ureteral stone, pyelonephritis, and sepsis.      WBC 20, F to 103, tachycardia to 120s. Complains of left flank and abdominal pain, F/C/N/V.  No blood thinners, last meal three days ago.     Interval History:     HEIDI  Tmax 100.3 yesterday morning at 0745  Remains intermittently tachycardic   Still complains of some left flank pain  No F/C    Review of Systems  Objective:     Temp:  [96.5 °F (35.8 °C)-100.3 °F (37.9 °C)] 96.5 °F (35.8 °C)  Pulse:  [] 88  Resp:  [16-24] 16  SpO2:  [94 %-99 %] 98 %  BP: (106-141)/(57-86) 106/57     There is no height or weight on file to calculate BMI.            Drains     Drain                 Urethral Catheter 11/01/18 0700 1 day                Physical Exam   Constitutional: She is oriented to person, place, and time. No distress.   HENT:   Head: Normocephalic and atraumatic.   Cardiovascular:  Tachycardia present.    Pulmonary/Chest: Effort normal. No respiratory distress.   Abdominal: Soft. She exhibits no distension. There is tenderness. There is CVA tenderness (left). There is no rebound.   Left sided abdominal tenderness.  Appendectomy scar.    Neurological: She is alert and oriented to person, place, and time.   Skin: Skin is warm. She is not diaphoretic.     Psychiatric: She has a normal mood and affect.       Significant Labs:    BMP:  Recent Labs   Lab 11/01/18 0151 11/02/18  0541   * 138   K 2.9* 3.9    113*   CO2 21* 18*   BUN 14 15   CREATININE 0.9 0.7   CALCIUM 9.2 8.4*       CBC:   Recent Labs   Lab 11/01/18 0151 11/02/18  0541   WBC 19.61* 17.85*   HGB 10.5*  8.7*   HCT 33.5* 27.4*   * 301       All pertinent labs results from the past 24 hours have been reviewed.    Significant Imaging:  All pertinent imaging results/findings from the past 24 hours have been reviewed.                  Assessment/Plan:     Pyelonephritis      - continue broad spectrum IV abx  - f/u UCx   - strict Is/Os  - regular diet   - IVF  - pain and nausea control -- allergy to toradol, tramadol   - daily labs   - drains:    - maintain johnson for now   - no further imaging at this time  - TEDs/SCDs/ GI PPx    Dispo:  Pending UCx and transition to PO abx resolution of tachycardia     Left ureteral stone    Will need outpatient ureteroscopy after adequate abx treatment course     Sepsis    See below         VTE Risk Mitigation (From admission, onward)        Ordered     Place PRAVEEN hose  Until discontinued      11/01/18 0628     Place sequential compression device  Until discontinued      11/01/18 0628     IP VTE LOW RISK PATIENT  Once      11/01/18 0628          Ahmet Aragon MD  Urology  [facility]

## 2018-11-02 NOTE — CARE UPDATE
Dr. Edward notified of patient taking own IB profen out of purse. Orders given to hold this dose of IV IB profen and Patient instructed not to take any of own meds. Will continue to monitor for s/s of distress.

## 2018-11-06 ENCOUNTER — ANESTHESIA EVENT (OUTPATIENT)
Dept: SURGERY | Facility: HOSPITAL | Age: 33
End: 2018-11-06
Payer: MEDICAID

## 2018-11-06 LAB — BACTERIA BLD CULT: NORMAL

## 2018-11-06 NOTE — PRE ADMISSION SCREENING
Anesthesia Assessment: Preoperative EQUATION    Planned Procedure: Procedure(s) (LRB):  REMOVAL, CALCULUS, URETER, URETEROSCOPIC (Left)  LITHOTRIPSY, USING LASER (Left)  REPLACEMENT, STENT (Left)  CYSTOSCOPY, WITH URETERAL STENT REMOVAL (Left)  Requested Anesthesia Type:General  Surgeon: Teressa Mobley MD  Service: Urology  Known or anticipated Date of Surgery:11/12/2018    Surgeon notes: reviewed    Electronic QUestionnaire Assessment completed via nurse interview with patient.         No Aq        Triage considerations:     The patient has no apparent active cardiac condition (No unstable coronary Syndrome such as severe unstable angina or recent [<1 month] myocardial infarction, decompensated CHF, severe valvular   disease or significant arrhythmia)    Previous anesthesia records:GETA  REMOVED]      Airway - Non-Surgical 11/01/18 0724 Endotracheal Tube   Airway Placement Date: 11/01/18 Placement Time: 0724 Method of Intubation: Direct laryngoscopy Inserted by: CRNA Airway Device: Endotracheal Tube Mask Ventilation: Not Attempted Intubated: Postinduction Blade: Gonzalez #2 Airway Device Size: 7.0 Style: Cuffed Cuff Inflation: Minimal occlusive pressure Inflation Amount (mL): 6 Placement Verified By: Auscultation;Capnometry Grade: Grade I Complicating Factors: None Findings Post-Intubation: Positive EtCO2;Bilateral breath sounds;Atraumatic/Condition of teeth unchanged Depth of Insertion (cm): 22 Secured at: Lips Complications: None Breath Sounds: Equal Bilateral Insertion attempts (enter comment if more than 2 attempts): 1 Removal Date: 11/01/18 Removal Time: 0740   Airway Placement Date: 11/01/18 Placement Time: 0724 Method of Intubation: Direct laryngoscopy Inserted by: CRNA Airway Device: Endotracheal Tube Airway Device Size: 7.0 Style: Cuffed Depth of Insertion (cm): 22 Inflation Amount (mL): 6 Placement Verified By: Auscultation;Capnometry Breath Sounds: Equal Bilateral Insertion attempts (enter comment  if more than 2 attempts): 1 Removal Date: 11/01/18 Removal Time: 0740       Last PCP note: outside [facility] Tiwari    Subspecialty notes: urology    Other important co-morbidities:   Kidney stone  Recent sepsis  Left ureteral stone  Pyelonephritis  anxiety     Tests already available:  Available tests,  within 1 month , within [facility] .            Instructions given. (See in Nurse's note)    Optimization: Recent procedure  11/1/ recently hospitalized discharged on antibiotics    Plan:    Testing:  TBD   Pre-anesthesia  visit       Visit focus: none     Consultation:to be determined        Navigation: Pt recently in ED with kidney stones, sepsis, pyelonephritis, Had cystoscopy with retrograde on 11/1. Pt was discharged on weekend , continues antibiotics. States continued frequency, burning, pain -- urology notified.

## 2018-11-06 NOTE — PRE-PROCEDURE INSTRUCTIONS
Spoke to pt. Reviewed meds, pt c/o pain, burning , states hasn't rested since got home.  Urology notified

## 2018-11-06 NOTE — ANESTHESIA PREPROCEDURE EVALUATION
Anesthesia Assessment: Preoperative EQUATION     Planned Procedure: Procedure(s) (LRB):  REMOVAL, CALCULUS, URETER, URETEROSCOPIC (Left)  LITHOTRIPSY, USING LASER (Left)  REPLACEMENT, STENT (Left)  CYSTOSCOPY, WITH URETERAL STENT REMOVAL (Left)  Requested Anesthesia Type:General  Surgeon: Teressa Mobley MD  Service: Urology  Known or anticipated Date of Surgery:11/12/2018     Surgeon notes: reviewed     Electronic QUestionnaire Assessment completed via nurse interview with patient.          No Aq           Triage considerations:      The patient has no apparent active cardiac condition (No unstable coronary Syndrome such as severe unstable angina or recent [<1 month] myocardial infarction, decompensated CHF, severe valvular   disease or significant arrhythmia)     Previous anesthesia records:GETA      REMOVED]      Airway - Non-Surgical 11/01/18 0724 Endotracheal Tube   Airway Placement Date: 11/01/18 Placement Time: 0724 Method of Intubation: Direct laryngoscopy Inserted by: CRNA Airway Device: Endotracheal Tube Mask Ventilation: Not Attempted Intubated: Postinduction Blade: Gonzalez #2 Airway Device Size: 7.0 Style: Cuffed Cuff Inflation: Minimal occlusive pressure Inflation Amount (mL): 6 Placement Verified By: Auscultation;Capnometry Grade: Grade I Complicating Factors: None Findings Post-Intubation: Positive EtCO2;Bilateral breath sounds;Atraumatic/Condition of teeth unchanged Depth of Insertion (cm): 22 Secured at: Lips Complications: None Breath Sounds: Equal Bilateral Insertion attempts (enter comment if more than 2 attempts): 1 Removal Date: 11/01/18 Removal Time: 0740   Airway Placement Date: 11/01/18 Placement Time: 0724 Method of Intubation: Direct laryngoscopy Inserted by: CRNA Airway Device: Endotracheal Tube Airway Device Size: 7.0 Style: Cuffed Depth of Insertion (cm): 22 Inflation Amount (mL): 6 Placement Verified By: Auscultation;Capnometry Breath Sounds: Equal Bilateral Insertion  attempts (enter comment if more than 2 attempts): 1 Removal Date: 11/01/18 Removal Time: 0740         Last PCP note: outside [facility] Tiwari     Subspecialty notes: urology     Other important co-morbidities:   Kidney stone  Recent sepsis  Left ureteral stone  Pyelonephritis  anxiety     Tests already available:  Available tests,  within 1 month , within [facility] .                            Instructions given. (See in Nurse's note)     Optimization: Recent procedure  11/1/ recently hospitalized discharged on antibiotics     Plan:    Testing:  TBD   Pre-anesthesia  visit                                        Visit focus: none                           Consultation:to be determined                               Navigation: Pt recently in ED with kidney stones, sepsis, pyelonephritis, Had cystoscopy with retrograde on 11/1. Pt was discharged on weekend , continues antibiotics. States continued frequency, burning, pain -- urology notified.                                                                                                                  11/06/2018  Latisha Guzmán is a 33 y.o., female.    Anesthesia Evaluation    I have reviewed the Patient Summary Reports.    I have reviewed the Nursing Notes.   I have reviewed the Medications.     Review of Systems  Anesthesia Hx:  No problems with previous Anesthesia  History of prior surgery of interest to airway management or planning: Previous anesthesia: General cystoscopy with retrogrades with general anesthesia.  Denies Family Hx of Anesthesia complications.   Denies Personal Hx of Anesthesia complications.   Social:  Smoker, No Alcohol Use    Hematology/Oncology:     Oncology Normal    -- Anemia: Hematology Comments: H/h  On 11/2/18  Was  8.7/27.4    EENT/Dental:EENT/Dental Normal   Cardiovascular:  Cardiovascular Normal     Pulmonary:  Pulmonary Normal    Renal/:   Chronic Renal Disease renal calculi Left ureteral stone  Recent admit to Ed sepsis,  pyelonephritis    Currently having pain & burning   Hepatic/GI:  Hepatic/GI Normal    Musculoskeletal:  Musculoskeletal Normal    Neurological:  Neurology Normal    Endocrine:  Endocrine Normal    Dermatological:  Skin Normal    Psych:   Psychiatric History anxiety          Physical Exam  General:  Well nourished    Airway/Jaw/Neck:  Airway Findings: Mouth Opening: Normal Tongue: Normal  Mallampati: II  Jaw/Neck Findings:  Neck ROM: Normal ROM      Dental:  Dental Findings: Periodontal disease, Severe    Chest/Lungs:  Chest/Lungs Findings: Clear to auscultation, Normal Respiratory Rate     Heart/Vascular:  Heart Findings: Rate: Normal  Rhythm: Regular Rhythm  Sounds: Normal        Mental Status:  Mental Status Findings:  Cooperative, Alert and Oriented         Anesthesia Plan  Type of Anesthesia, risks & benefits discussed:  Anesthesia Type:  general  Patient's Preference:   Intra-op Monitoring Plan: standard ASA monitors  Intra-op Monitoring Plan Comments:   Post Op Pain Control Plan:   Post Op Pain Control Plan Comments:   Induction:   IV  Beta Blocker:  Patient is not currently on a Beta-Blocker (No further documentation required).       Informed Consent: Patient understands risks and agrees with Anesthesia plan.  Questions answered. Anesthesia consent signed with patient.  ASA Score: 2     Day of Surgery Review of History & Physical:    H&P update referred to the surgeon.         Ready For Surgery From Anesthesia Perspective.

## 2018-11-07 ENCOUNTER — HOSPITAL ENCOUNTER (OUTPATIENT)
Dept: RADIOLOGY | Facility: HOSPITAL | Age: 33
Discharge: HOME | End: 2018-11-07
Attending: UROLOGY
Payer: MEDICAID

## 2018-11-07 ENCOUNTER — TELEPHONE (OUTPATIENT)
Dept: UROLOGY | Facility: CLINIC | Age: 33
End: 2018-11-07

## 2018-11-07 DIAGNOSIS — R30.0 DYSURIA: ICD-10-CM

## 2018-11-07 DIAGNOSIS — R10.9 FLANK PAIN: Primary | ICD-10-CM

## 2018-11-07 DIAGNOSIS — R10.9 FLANK PAIN: ICD-10-CM

## 2018-11-07 PROCEDURE — 76770 US EXAM ABDO BACK WALL COMP: CPT | Mod: TC

## 2018-11-07 PROCEDURE — 76770 US EXAM ABDO BACK WALL COMP: CPT | Mod: 26,,, | Performed by: RADIOLOGY

## 2018-11-07 NOTE — TELEPHONE ENCOUNTER
Spoke with pt and she states that she is having migraines and left lower back pain as before her sx, dysuria weak urine flow. Informed that Dr. Mobley will be notified and we'll be in touch with her.    ----- Message from Carly Lafleur RN sent at 11/6/2018  3:49 PM CST -----  Spoke to pt for update on anesthesia record. She is complaining of burning, pain. States hasn't been able to sleep since discharge. She is taking ibuprofen at present. Please call.      Greer ROCK BC  Pre-op anesthesia

## 2018-11-07 NOTE — TELEPHONE ENCOUNTER
3:00 labs, 3:30 ultrasound, 4 o-clock appt.   Patient aware.     Want to make sure WBC trending down and no abscess in left kidney

## 2018-11-09 ENCOUNTER — TELEPHONE (OUTPATIENT)
Dept: UROLOGY | Facility: CLINIC | Age: 33
End: 2018-11-09

## 2018-11-09 NOTE — TELEPHONE ENCOUNTER
----- Message from Teressa Mobley MD sent at 11/9/2018  9:38 AM CST -----  Let patient know all labs looked good

## 2018-11-12 ENCOUNTER — TELEPHONE (OUTPATIENT)
Dept: UROLOGY | Facility: CLINIC | Age: 33
End: 2018-11-12

## 2018-11-12 ENCOUNTER — HOSPITAL ENCOUNTER (OUTPATIENT)
Facility: HOSPITAL | Age: 33
Discharge: HOME | End: 2018-11-12
Attending: UROLOGY | Admitting: UROLOGY
Payer: MEDICAID

## 2018-11-12 ENCOUNTER — ANESTHESIA (OUTPATIENT)
Dept: SURGERY | Facility: HOSPITAL | Age: 33
End: 2018-11-12
Payer: MEDICAID

## 2018-11-12 VITALS
RESPIRATION RATE: 18 BRPM | OXYGEN SATURATION: 100 % | WEIGHT: 165 LBS | HEIGHT: 66 IN | BODY MASS INDEX: 26.52 KG/M2 | DIASTOLIC BLOOD PRESSURE: 70 MMHG | HEART RATE: 82 BPM | SYSTOLIC BLOOD PRESSURE: 130 MMHG | TEMPERATURE: 98 F

## 2018-11-12 DIAGNOSIS — N20.0 KIDNEY STONE: ICD-10-CM

## 2018-11-12 DIAGNOSIS — N20.0 KIDNEY STONES: Primary | ICD-10-CM

## 2018-11-12 DIAGNOSIS — N20.1 URETERAL STONE: Primary | ICD-10-CM

## 2018-11-12 LAB
B-HCG UR QL: NEGATIVE
CTP QC/QA: YES

## 2018-11-12 PROCEDURE — D9220A PRA ANESTHESIA: Mod: CRNA,,, | Performed by: NURSE ANESTHETIST, CERTIFIED REGISTERED

## 2018-11-12 PROCEDURE — C1769 GUIDE WIRE: HCPCS | Performed by: UROLOGY

## 2018-11-12 PROCEDURE — 52352 CYSTOURETERO W/STONE REMOVE: CPT | Mod: LT,,, | Performed by: UROLOGY

## 2018-11-12 PROCEDURE — 81025 URINE PREGNANCY TEST: CPT | Performed by: UROLOGY

## 2018-11-12 PROCEDURE — 76000 FLUOROSCOPY <1 HR PHYS/QHP: CPT | Mod: 26,59,, | Performed by: UROLOGY

## 2018-11-12 PROCEDURE — C2617 STENT, NON-COR, TEM W/O DEL: HCPCS | Performed by: UROLOGY

## 2018-11-12 PROCEDURE — 37000009 HC ANESTHESIA EA ADD 15 MINS: Performed by: UROLOGY

## 2018-11-12 PROCEDURE — 63600175 PHARM REV CODE 636 W HCPCS: Performed by: NURSE ANESTHETIST, CERTIFIED REGISTERED

## 2018-11-12 PROCEDURE — 25000003 PHARM REV CODE 250: Performed by: NURSE ANESTHETIST, CERTIFIED REGISTERED

## 2018-11-12 PROCEDURE — 71000015 HC POSTOP RECOV 1ST HR: Performed by: UROLOGY

## 2018-11-12 PROCEDURE — 63600175 PHARM REV CODE 636 W HCPCS: Performed by: STUDENT IN AN ORGANIZED HEALTH CARE EDUCATION/TRAINING PROGRAM

## 2018-11-12 PROCEDURE — 36000706: Performed by: UROLOGY

## 2018-11-12 PROCEDURE — 71000016 HC POSTOP RECOV ADDL HR: Performed by: UROLOGY

## 2018-11-12 PROCEDURE — 00918 ANES TRURL PX URTRL CAL RMVL: CPT | Performed by: UROLOGY

## 2018-11-12 PROCEDURE — 37000008 HC ANESTHESIA 1ST 15 MINUTES: Performed by: UROLOGY

## 2018-11-12 PROCEDURE — D9220A PRA ANESTHESIA: Mod: ANES,,, | Performed by: ANESTHESIOLOGY

## 2018-11-12 PROCEDURE — 82365 CALCULUS SPECTROSCOPY: CPT

## 2018-11-12 PROCEDURE — 25000003 PHARM REV CODE 250: Performed by: STUDENT IN AN ORGANIZED HEALTH CARE EDUCATION/TRAINING PROGRAM

## 2018-11-12 PROCEDURE — 36000707: Performed by: UROLOGY

## 2018-11-12 PROCEDURE — 52332 CYSTOSCOPY AND TREATMENT: CPT | Mod: 51,LT,, | Performed by: UROLOGY

## 2018-11-12 DEVICE — STENT URETERAL UNIV 6FR 24CM: Type: IMPLANTABLE DEVICE | Site: URETER | Status: FUNCTIONAL

## 2018-11-12 RX ORDER — ONDANSETRON 2 MG/ML
INJECTION INTRAMUSCULAR; INTRAVENOUS
Status: DISCONTINUED | OUTPATIENT
Start: 2018-11-12 | End: 2018-11-12

## 2018-11-12 RX ORDER — GLYCOPYRROLATE 0.2 MG/ML
INJECTION INTRAMUSCULAR; INTRAVENOUS
Status: DISCONTINUED | OUTPATIENT
Start: 2018-11-12 | End: 2018-11-12

## 2018-11-12 RX ORDER — TAMSULOSIN HYDROCHLORIDE 0.4 MG/1
0.4 CAPSULE ORAL DAILY
Qty: 30 CAPSULE | Refills: 0 | Status: SHIPPED | OUTPATIENT
Start: 2018-11-12 | End: 2022-05-23

## 2018-11-12 RX ORDER — LIDOCAINE HCL/PF 100 MG/5ML
SYRINGE (ML) INTRAVENOUS
Status: DISCONTINUED | OUTPATIENT
Start: 2018-11-12 | End: 2018-11-12

## 2018-11-12 RX ORDER — FENTANYL CITRATE 50 UG/ML
INJECTION, SOLUTION INTRAMUSCULAR; INTRAVENOUS
Status: DISCONTINUED | OUTPATIENT
Start: 2018-11-12 | End: 2018-11-12

## 2018-11-12 RX ORDER — HYDROCODONE BITARTRATE AND ACETAMINOPHEN 5; 325 MG/1; MG/1
1 TABLET ORAL EVERY 6 HOURS PRN
Qty: 5 TABLET | Refills: 0 | Status: ON HOLD | OUTPATIENT
Start: 2018-11-12 | End: 2022-05-23 | Stop reason: HOSPADM

## 2018-11-12 RX ORDER — PROPOFOL 10 MG/ML
VIAL (ML) INTRAVENOUS
Status: DISCONTINUED | OUTPATIENT
Start: 2018-11-12 | End: 2018-11-12

## 2018-11-12 RX ORDER — HYDROCODONE BITARTRATE AND ACETAMINOPHEN 5; 325 MG/1; MG/1
1 TABLET ORAL EVERY 4 HOURS PRN
Status: DISCONTINUED | OUTPATIENT
Start: 2018-11-12 | End: 2018-11-12 | Stop reason: HOSPADM

## 2018-11-12 RX ORDER — CEFDINIR 300 MG/1
300 CAPSULE ORAL 2 TIMES DAILY
Qty: 10 CAPSULE | Refills: 0 | Status: SHIPPED | OUTPATIENT
Start: 2018-11-12 | End: 2018-11-17

## 2018-11-12 RX ORDER — LIDOCAINE HYDROCHLORIDE 10 MG/ML
1 INJECTION, SOLUTION EPIDURAL; INFILTRATION; INTRACAUDAL; PERINEURAL ONCE
Status: DISCONTINUED | OUTPATIENT
Start: 2018-11-12 | End: 2018-11-12 | Stop reason: HOSPADM

## 2018-11-12 RX ORDER — MIDAZOLAM HYDROCHLORIDE 1 MG/ML
INJECTION, SOLUTION INTRAMUSCULAR; INTRAVENOUS
Status: DISCONTINUED | OUTPATIENT
Start: 2018-11-12 | End: 2018-11-12

## 2018-11-12 RX ORDER — ONDANSETRON 8 MG/1
8 TABLET, ORALLY DISINTEGRATING ORAL EVERY 8 HOURS PRN
Status: DISCONTINUED | OUTPATIENT
Start: 2018-11-12 | End: 2018-11-12 | Stop reason: HOSPADM

## 2018-11-12 RX ORDER — ROCURONIUM BROMIDE 10 MG/ML
INJECTION, SOLUTION INTRAVENOUS
Status: DISCONTINUED | OUTPATIENT
Start: 2018-11-12 | End: 2018-11-12

## 2018-11-12 RX ORDER — SODIUM CHLORIDE 9 MG/ML
INJECTION, SOLUTION INTRAVENOUS CONTINUOUS
Status: DISCONTINUED | OUTPATIENT
Start: 2018-11-12 | End: 2018-11-12 | Stop reason: HOSPADM

## 2018-11-12 RX ORDER — DEXAMETHASONE SODIUM PHOSPHATE 4 MG/ML
INJECTION, SOLUTION INTRA-ARTICULAR; INTRALESIONAL; INTRAMUSCULAR; INTRAVENOUS; SOFT TISSUE
Status: DISCONTINUED | OUTPATIENT
Start: 2018-11-12 | End: 2018-11-12

## 2018-11-12 RX ADMIN — ROCURONIUM BROMIDE 40 MG: 10 INJECTION, SOLUTION INTRAVENOUS at 01:11

## 2018-11-12 RX ADMIN — HYDROCODONE BITARTRATE AND ACETAMINOPHEN 1 TABLET: 5; 325 TABLET ORAL at 02:11

## 2018-11-12 RX ADMIN — SUGAMMADEX 299 MG: 100 INJECTION, SOLUTION INTRAVENOUS at 01:11

## 2018-11-12 RX ADMIN — GLYCOPYRROLATE 0.2 MG: 0.2 INJECTION, SOLUTION INTRAMUSCULAR; INTRAVENOUS at 01:11

## 2018-11-12 RX ADMIN — PROPOFOL 200 MG: 10 INJECTION, EMULSION INTRAVENOUS at 01:11

## 2018-11-12 RX ADMIN — GENTAMICIN SULFATE 240 MG: 40 INJECTION, SOLUTION INTRAMUSCULAR; INTRAVENOUS at 01:11

## 2018-11-12 RX ADMIN — AMPICILLIN SODIUM 1 G: 1 INJECTION, POWDER, FOR SOLUTION INTRAMUSCULAR; INTRAVENOUS at 01:11

## 2018-11-12 RX ADMIN — MIDAZOLAM HYDROCHLORIDE 2 MG: 1 INJECTION, SOLUTION INTRAMUSCULAR; INTRAVENOUS at 01:11

## 2018-11-12 RX ADMIN — ONDANSETRON 4 MG: 2 INJECTION INTRAMUSCULAR; INTRAVENOUS at 01:11

## 2018-11-12 RX ADMIN — DEXAMETHASONE SODIUM PHOSPHATE 8 MG: 4 INJECTION, SOLUTION INTRAMUSCULAR; INTRAVENOUS at 01:11

## 2018-11-12 RX ADMIN — CARBOXYMETHYLCELLULOSE SODIUM 2 DROP: 2.5 SOLUTION/ DROPS OPHTHALMIC at 01:11

## 2018-11-12 RX ADMIN — SODIUM CHLORIDE: 0.9 INJECTION, SOLUTION INTRAVENOUS at 01:11

## 2018-11-12 RX ADMIN — LIDOCAINE HYDROCHLORIDE 80 MG: 20 INJECTION, SOLUTION INTRAVENOUS at 01:11

## 2018-11-12 RX ADMIN — FENTANYL CITRATE 100 MCG: 50 INJECTION, SOLUTION INTRAMUSCULAR; INTRAVENOUS at 01:11

## 2018-11-12 NOTE — INTERVAL H&P NOTE
The patient has been examined and the H&P has been reviewed:    I concur with the findings and no changes have occurred since H&P was written.     She has been on cefdinir. Plan for ureteroscopy today.    Urine dipstick + for blood and leuks, negative for all other components    Anesthesia/Surgery risks, benefits and alternative options discussed and understood by patient/family.          Active Hospital Problems    Diagnosis  POA    Ureteral stone [N20.1]  Yes      Resolved Hospital Problems   No resolved problems to display.

## 2018-11-12 NOTE — DISCHARGE SUMMARY
[facility]  Discharge Note  Short Stay    Admit Date: 11/12/2018    Discharge Date and Time: 11/12/2018 2:10 PM      Attending Physician: Teressa Mobley MD     Discharge Provider: Matty Parker    Diagnoses:  Active Hospital Problems    Diagnosis  POA    Ureteral stone [N20.1]  Yes      Resolved Hospital Problems   No resolved problems to display.       Discharged Condition: good    Hospital Course: Patient was admitted for left ureteroscopy, placement of left ureteral stent and tolerated the procedure well with no complications. The patient was discharged home in good condition on the same day.       Final Diagnoses: Same as principal problem.    Disposition: Home or Self Care    Follow up/Patient Instructions:    Medications:  Reconciled Home Medications:   Current Discharge Medication List      START taking these medications    Details   !! cefdinir (OMNICEF) 300 MG capsule Take 1 capsule (300 mg total) by mouth 2 (two) times daily. for 5 days  Qty: 10 capsule, Refills: 0      HYDROcodone-acetaminophen (NORCO) 5-325 mg per tablet Take 1 tablet by mouth every 6 (six) hours as needed for Pain.  Qty: 5 tablet, Refills: 0      !! tamsulosin (FLOMAX) 0.4 mg Cap Take 1 capsule (0.4 mg total) by mouth once daily.  Qty: 30 capsule, Refills: 0       !! - Potential duplicate medications found. Please discuss with provider.      CONTINUE these medications which have NOT CHANGED    Details   !! cefdinir (OMNICEF) 300 MG capsule Take 1 capsule (300 mg total) by mouth 2 (two) times daily. for 14 days  Qty: 28 capsule, Refills: 0      ibuprofen (ADVIL,MOTRIN) 600 MG tablet Take 1 tablet (600 mg total) by mouth every 6 (six) hours as needed for Pain.  Qty: 20 tablet, Refills: 0      oxybutynin (DITROPAN) 5 MG Tab Take 1 tablet (5 mg total) by mouth 3 (three) times daily as needed.  Qty: 30 tablet, Refills: 0      oxyCODONE-acetaminophen (PERCOCET) 5-325 mg per tablet Take 1 tablet by mouth every 6 (six) hours  as needed.  Qty: 8 tablet, Refills: 0      !! tamsulosin (FLOMAX) 0.4 mg Cap Take 1 capsule (0.4 mg total) by mouth every evening.  Qty: 30 capsule, Refills: 0       !! - Potential duplicate medications found. Please discuss with provider.        Discharge Procedure Orders   US Kidney   Standing Status: Future Standing Exp. Date: 11/12/19     Order Specific Question Answer Comments   May the Radiologist modify the order per protocol to meet the clinical needs of the patient? Yes      Diet general     Follow-up Information     Teressa Mobley MD In 6 weeks.    Specialty:  Urology  Why:  post op ureteroscopy, renal ultrasound  Contact information:  [address]  [facility] [address] [phone]

## 2018-11-12 NOTE — TRANSFER OF CARE
"Anesthesia Transfer of Care Note    Patient: Latisha Guzmán    Procedure(s) Performed: Procedure(s) (LRB):  REMOVAL, CALCULUS, URETER, URETEROSCOPIC (Left)  LITHOTRIPSY, USING LASER (Left)  REPLACEMENT, STENT (Left)  CYSTOSCOPY, WITH URETERAL STENT REMOVAL (Left)    Patient location: PACU    Anesthesia Type: general    Transport from OR: Transported from OR on 6-10 L/min O2 by face mask with adequate spontaneous ventilation    Post pain: adequate analgesia    Post assessment: tolerated procedure well and no apparent anesthetic complications    Post vital signs: stable    Level of consciousness: awake, alert and oriented    Nausea/Vomiting: no nausea/vomiting    Complications: none    Transfer of care protocol was followed      Last vitals:   Visit Vitals  BP (!) 141/93 (BP Location: Left arm, Patient Position: Lying)   Pulse 92   Temp 36.7 °C (98.1 °F) (Oral)   Resp 16   Ht 5' 6" (1.676 m)   Wt 74.8 kg (165 lb)   LMP 11/01/2018   SpO2 100%   Breastfeeding? No   BMI 26.63 kg/m²     "

## 2018-11-12 NOTE — OP NOTE
[facility]  Operative Note    Date: 11/12/2018    Pre-Op Diagnosis: left ureteral stone    Patient Active Problem List    Diagnosis Date Noted    Ureteral stone 11/12/2018    Kidney stone 11/01/2018    Sepsis 11/01/2018    Left ureteral stone 11/01/2018    Pyelonephritis 11/01/2018    Back pain 09/29/2015       Post-Op Diagnosis: same    Procedure(s) Performed:   1.  Left ureteroscopy  2.  Cystoscopy  3.  Left stone basket extraction  4.  Placement of left JJ ureteral stent on strings  5.  Fluoro < 1 h    Specimen(s): Ureteral stone    Staff Surgeon: Teressa Mobley MD    Assistant Surgeon: Matty Parker MD, Dee Curiel MD    Anesthesia: General endotracheal anesthesia    Indications: Latisha Guzmán is a 33 y.o. female with a left ureteral stone, presenting for definitive stone management.  She currently has JJ ureteral stent in place.      Findings:     Left ureteral stone encountered in the mid ureter, removed with basket and sent for analysis  6 Fr x 24 cm JJ ureteral stent with strings placed    1 baskets were used throughout the case.      Estimated Blood Loss: min    Drains: 6 Fr x 24 cm JJ ureteral stent with strings    Procedure in detail:  After informed consent was obtained, the patient was brought the the cystoscopy suite and placed in the supine position.  SCDs were applied and working.  Anesthesia was administered.  The patient was then placed in the dorsal lithotomy position and prepped and draped in the usual sterile fashion.      A rigid cystoscope in a 22 Fr sheath was introduced into the patient's urethra.  This passed easily.  The entire urethra was visualized which showed no strictures or masses.  Formal cystoscopy was performed which revealed no masses or lesions suspicious for malignancy, no bladder stones, no bladder diverticuli, no trabeculations.  The ureteral orifices were visualized in the normal anatomic position bilaterally and a left JJ stent coil was  noted, this was pulled to the meatus with stent graspers.    A motionwire was passed up the stent and up into the kidney and the stent was removed. This passed easily and placement was confirmed using fluoro. The cystoscope was removed keeping the wire in place.    An 8 Fr rigid ureteroscope was passed into the patient's bladder alongside the wire under direct vision.  It was then passed through the left ureteral orifice alongside the wire.  A stone was encountered in the mid ureter.  A Nitinol tipless basket was introduced through the ureteroscope and the stone was removed and sent for analysis.  The ureteroscope was removed keeping the motion wire in place.     A 6 Fr x 24 cm JJ ureteral stent with strings was passed over the wire and up into the renal pelvis using fluoro.  When the coil appeared to be in good position in the kidney the wire was removed under continuous fluoro.  Good coils were seen in the kidney and the bladder using fluoro.      The patient tolerated the procedure well and was transferred to the recovery room in stable condition.      Disposition:  The patient will follow up with Dr. Mobley with a renal ultrasound in 6 weeks. She was given prescriptions for norco, cefdinir, and tamsulosin. She was instructed to pull her stent on strings on Friday morning.    Matty Parker MD

## 2018-11-12 NOTE — PLAN OF CARE
"Pre procedure complete, except anesthesia consent and IV start. Pt reports she is a "very difficult "stick.will notify anesthesia, as 2 nurses unsucccessful.  "

## 2018-11-13 LAB
ANNOTATION COMMENT IMP: NORMAL
COMPN STONE: NORMAL
SPECIMEN SOURCE: NORMAL
STONE ANALYSIS IR-IMP: NORMAL

## 2018-11-13 NOTE — ANESTHESIA RELEASE NOTE
Anesthesia Release from PACU Note    Patient: Latisha Guzmán    Procedure(s) Performed: Procedure(s) (LRB):  REMOVAL, CALCULUS, URETER, URETEROSCOPIC (Left)  LITHOTRIPSY, USING LASER (Left)  REPLACEMENT, STENT (Left)  CYSTOSCOPY, WITH URETERAL STENT REMOVAL (Left)    Anesthesia type: general    Post pain: Adequate analgesia    Post assessment: no apparent anesthetic complications and tolerated procedure well    Last Vitals:   Vitals:    11/12/18 1515   BP: 130/70   Pulse: 82   Resp: 18   Temp: 36.8 °C (98.2 °F)         Post vital signs: stable    Level of consciousness: awake and alert     Nausea/Vomiting: no nausea/no vomiting    Complications: none    Airway Patency: patent    Respiratory: unassisted    Cardiovascular: stable and blood pressure at baseline    Hydration: euvolemic

## 2018-11-13 NOTE — ANESTHESIA POSTPROCEDURE EVALUATION
"Anesthesia Post Evaluation    Patient: Latisha Guzmán    Procedure(s) Performed: Procedure(s) (LRB):  REMOVAL, CALCULUS, URETER, URETEROSCOPIC (Left)  LITHOTRIPSY, USING LASER (Left)  REPLACEMENT, STENT (Left)  CYSTOSCOPY, WITH URETERAL STENT REMOVAL (Left)    Final Anesthesia Type: general  Patient location during evaluation: PACU  Patient participation: Yes- Able to Participate  Level of consciousness: awake and alert  Post-procedure vital signs: reviewed and stable  Pain management: adequate  Airway patency: patent  PONV status at discharge: No PONV  Anesthetic complications: no      Cardiovascular status: blood pressure returned to baseline  Respiratory status: unassisted, spontaneous ventilation and room air  Hydration status: euvolemic  Follow-up not needed.        Visit Vitals  /70   Pulse 82   Temp 36.8 °C (98.2 °F) (Temporal)   Resp 18   Ht 5' 6" (1.676 m)   Wt 74.8 kg (165 lb)   LMP 11/01/2018   SpO2 100%   Breastfeeding? No   BMI 26.63 kg/m²       Pain/Jessy Score: Pain Assessment Performed: Yes (11/12/2018  3:14 PM)  Presence of Pain: complains of pain/discomfort (11/12/2018  3:14 PM)  Pain Rating Prior to Med Admin: 4 (11/12/2018  3:14 PM)        "

## 2018-11-13 NOTE — TELEPHONE ENCOUNTER
I spoke with patient's father, who agreed to appts dates and times,locations. Patient's father aware patient is to pull stent strings on this Friday morning. Father is asking for any pain medicine that could be sent to her pharmacy. I advised that I will send  a message for the pain meds.

## 2021-09-27 NOTE — H&P
See consult note.     Ahmet Aragon MD, PGY-IV  [facility]       
Weight Units: pounds
Show How Many Months Of Anticipated Therapy Are Left: No
Ipledge Number (Optional): [id number]
Hypertriglyceridemia Monitoring: I explained this is common when taking isotretinoin. If this worsens they will contact us.
Counseling Text: I reviewed the side effect in detail. Patient should get monthly blood tests, not donate blood, not drive at night if vision affected, and not share medication.
Headache Monitoring: I recommended monitoring the headaches for now. There is no evidence of increased intracranial pressure. They were instructed to call if the headaches are worsening.
Use Therapeutic Ranged Or Therapeutic Target: please select Range or Target
Hypercholesterolemia Monitoring: I explained this is common when taking isotretinoin. We will monitor closely.
Patient Weight (Optional But Required For Cumulative Dose-Numbers And Decimals Only): 130
Female Pregnancy Counseling Text: Female patients should also be on two forms of birth control while taking this medication and for one month after their last dose.
Myalgia Treatment: I explained this is common when taking isotretinoin. If this worsens they will contact us. They may try OTC ibuprofen.
Months Of Therapy Completed: 5
Cheilitis Normal Treatment: I recommended application of Vaseline or Aquaphor numerous times a day (as often as every hour) and before going to bed.
Next Month's Dosage: Continue Current Dosage
Kilograms Preamble Statement (Weight Entered In Details Tab): Reported Weight in kilograms:
Pounds Preamble Statement (Weight Entered In Details Tab): Reported Weight in pounds:
Lower Range (In Mg/Kg): 120
Cheilitis Aggressive Treatment: I recommended application of Vaseline or Aquaphor numerous times a day (as often as every hour) and before going to bed. I also prescribed a topical steroid for twice daily use.
Female Completion Statement: After discussing her treatment course we decided to discontinue isotretinoin therapy at this time. I explained that she would need to continue her birth control methods for at least one month after the last dosage. She should also get a pregnancy test one month after the last dose. She shouldn't donate blood for one month after the last dose. She should call with any new symptoms of depression.
Target Cumulative Dosage (In Mg/Kg): 135
Upper Range (In Mg/Kg): 150
Comments: Starting month 6. Plan for one month if clear.
Male Completion Statement: After discussing his treatment course we decided to discontinue isotretinoin therapy at this time. He shouldn't donate blood for one month after the last dose. He should call with any new symptoms of depression.
Dosing Month 1 (Required For Cumulative Dosing): 40mg Daily
Dosing Month 2 (Required For Cumulative Dosing): 30mg BID
Retinoid Dermatitis Normal Treatment: I recommended more frequent application of Cetaphil or CeraVe to the areas of dermatitis.
Show Text Field For Brand Names Of Contraception?: Yes
Detail Level: Zone
Xerosis Normal Treatment: I recommended application of Cetaphil or CeraVe numerous times a day going to bed to all dry areas.
Retinoid Dermatitis Aggressive Treatment: I recommended more frequent application of Cetaphil or CeraVe to the areas of dermatitis. I also prescribed a topical steroid for twice daily use until the dermatitis resolves.
Dosing Month 3 (Required For Cumulative Dosing): 40mg BID
Nosebleeds Normal Treatment: I explained this is common when taking isotretinoin. I recommended saline mist in each nostril multiple times a day. If this worsens they will contact us.
Xerosis Aggressive Treatment: I recommended application of Cetaphil or CeraVe numerous times a day going to bed to all dry areas. I also prescribed a topical steroid for twice daily use.
What Is The Patient's Gender: Male
Xerosis Aggressive Treatment: I recommended application of Cetaphil or CeraVe numerous times a day and before going to bed to all dry areas. I also prescribed a topical steroid for twice daily use.
Xerosis Normal Treatment: I recommended application of Cetaphil or CeraVe numerous times a day and before going to bed to all dry areas.

## 2022-05-21 ENCOUNTER — HOSPITAL ENCOUNTER (INPATIENT)
Facility: HOSPITAL | Age: 37
LOS: 4 days | Discharge: HOME | End: 2022-05-25
Attending: OBSTETRICS & GYNECOLOGY | Admitting: OBSTETRICS & GYNECOLOGY
Payer: MEDICAID

## 2022-05-21 DIAGNOSIS — O36.4XX0 FETAL DEMISE AFFECTING DELIVERY: ICD-10-CM

## 2022-05-21 DIAGNOSIS — O41.1230 CHORIOAMNIONITIS IN THIRD TRIMESTER, SINGLE OR UNSPECIFIED FETUS: Primary | ICD-10-CM

## 2022-05-21 DIAGNOSIS — O41.1290 CHORIOAMNIONITIS: ICD-10-CM

## 2022-05-21 LAB
ABO + RH BLD: NORMAL
ALBUMIN SERPL BCP-MCNC: 1.7 G/DL (ref 3.5–5.2)
ALP SERPL-CCNC: 399 U/L (ref 55–135)
ALT SERPL W/O P-5'-P-CCNC: 8 U/L (ref 10–44)
ANION GAP SERPL CALC-SCNC: 17 MMOL/L (ref 8–16)
ANISOCYTOSIS BLD QL SMEAR: SLIGHT
AST SERPL-CCNC: 15 U/L (ref 10–40)
BASOPHILS # BLD AUTO: 0.09 K/UL (ref 0–0.2)
BASOPHILS # BLD AUTO: 0.1 K/UL (ref 0–0.2)
BASOPHILS NFR BLD: 0.4 % (ref 0–1.9)
BASOPHILS NFR BLD: 0.6 % (ref 0–1.9)
BILIRUB SERPL-MCNC: 0.4 MG/DL (ref 0.1–1)
BLD GP AB SCN CELLS X3 SERPL QL: NORMAL
BUN SERPL-MCNC: 20 MG/DL (ref 6–20)
BURR CELLS BLD QL SMEAR: ABNORMAL
CALCIUM SERPL-MCNC: 9.4 MG/DL (ref 8.7–10.5)
CHLORIDE SERPL-SCNC: 105 MMOL/L (ref 95–110)
CO2 SERPL-SCNC: 17 MMOL/L (ref 23–29)
CREAT SERPL-MCNC: 0.9 MG/DL (ref 0.5–1.4)
DACRYOCYTES BLD QL SMEAR: ABNORMAL
DIFFERENTIAL METHOD: ABNORMAL
DIFFERENTIAL METHOD: ABNORMAL
EOSINOPHIL # BLD AUTO: 0 K/UL (ref 0–0.5)
EOSINOPHIL # BLD AUTO: 0 K/UL (ref 0–0.5)
EOSINOPHIL NFR BLD: 0.1 % (ref 0–8)
EOSINOPHIL NFR BLD: 0.1 % (ref 0–8)
ERYTHROCYTE [DISTWIDTH] IN BLOOD BY AUTOMATED COUNT: 18.8 % (ref 11.5–14.5)
ERYTHROCYTE [DISTWIDTH] IN BLOOD BY AUTOMATED COUNT: 19.2 % (ref 11.5–14.5)
EST. GFR  (AFRICAN AMERICAN): >60 ML/MIN/1.73 M^2
EST. GFR  (NON AFRICAN AMERICAN): >60 ML/MIN/1.73 M^2
GIANT PLATELETS BLD QL SMEAR: PRESENT
GLUCOSE SERPL-MCNC: 83 MG/DL (ref 70–110)
HCT VFR BLD AUTO: 25.6 % (ref 37–48.5)
HCT VFR BLD AUTO: 27.5 % (ref 37–48.5)
HGB BLD-MCNC: 8.1 G/DL (ref 12–16)
HGB BLD-MCNC: 8.3 G/DL (ref 12–16)
HYPOCHROMIA BLD QL SMEAR: ABNORMAL
IMM GRANULOCYTES # BLD AUTO: 0.16 K/UL (ref 0–0.04)
IMM GRANULOCYTES # BLD AUTO: 0.26 K/UL (ref 0–0.04)
IMM GRANULOCYTES NFR BLD AUTO: 1.1 % (ref 0–0.5)
IMM GRANULOCYTES NFR BLD AUTO: 1.1 % (ref 0–0.5)
LYMPHOCYTES # BLD AUTO: 0.6 K/UL (ref 1–4.8)
LYMPHOCYTES # BLD AUTO: 1.1 K/UL (ref 1–4.8)
LYMPHOCYTES NFR BLD: 3.8 % (ref 18–48)
LYMPHOCYTES NFR BLD: 4.5 % (ref 18–48)
MCH RBC QN AUTO: 21.8 PG (ref 27–31)
MCH RBC QN AUTO: 22.3 PG (ref 27–31)
MCHC RBC AUTO-ENTMCNC: 30.2 G/DL (ref 32–36)
MCHC RBC AUTO-ENTMCNC: 31.6 G/DL (ref 32–36)
MCV RBC AUTO: 70 FL (ref 82–98)
MCV RBC AUTO: 72 FL (ref 82–98)
MONOCYTES # BLD AUTO: 0.1 K/UL (ref 0.3–1)
MONOCYTES # BLD AUTO: 0.2 K/UL (ref 0.3–1)
MONOCYTES NFR BLD: 0.6 % (ref 4–15)
MONOCYTES NFR BLD: 0.8 % (ref 4–15)
NEUTROPHILS # BLD AUTO: 14 K/UL (ref 1.8–7.7)
NEUTROPHILS # BLD AUTO: 22.5 K/UL (ref 1.8–7.7)
NEUTROPHILS NFR BLD: 93.1 % (ref 38–73)
NEUTROPHILS NFR BLD: 93.8 % (ref 38–73)
NRBC BLD-RTO: 0 /100 WBC
NRBC BLD-RTO: 1 /100 WBC
OVALOCYTES BLD QL SMEAR: ABNORMAL
PLATELET # BLD AUTO: 484 K/UL (ref 150–450)
PLATELET # BLD AUTO: 559 K/UL (ref 150–450)
PLATELET BLD QL SMEAR: ABNORMAL
PMV BLD AUTO: 8.8 FL (ref 9.2–12.9)
PMV BLD AUTO: 9.1 FL (ref 9.2–12.9)
POIKILOCYTOSIS BLD QL SMEAR: SLIGHT
POLYCHROMASIA BLD QL SMEAR: ABNORMAL
POTASSIUM SERPL-SCNC: 3.7 MMOL/L (ref 3.5–5.1)
PROT SERPL-MCNC: 6.8 G/DL (ref 6–8.4)
RBC # BLD AUTO: 3.64 M/UL (ref 4–5.4)
RBC # BLD AUTO: 3.81 M/UL (ref 4–5.4)
SODIUM SERPL-SCNC: 139 MMOL/L (ref 136–145)
SPHEROCYTES BLD QL SMEAR: ABNORMAL
WBC # BLD AUTO: 14.89 K/UL (ref 3.9–12.7)
WBC # BLD AUTO: 24.14 K/UL (ref 3.9–12.7)

## 2022-05-21 PROCEDURE — 63600175 PHARM REV CODE 636 W HCPCS

## 2022-05-21 PROCEDURE — 87389 HIV-1 AG W/HIV-1&-2 AB AG IA: CPT | Performed by: OBSTETRICS & GYNECOLOGY

## 2022-05-21 PROCEDURE — 25000003 PHARM REV CODE 250: Performed by: OBSTETRICS & GYNECOLOGY

## 2022-05-21 PROCEDURE — 86900 BLOOD TYPING SEROLOGIC ABO: CPT | Performed by: OBSTETRICS & GYNECOLOGY

## 2022-05-21 PROCEDURE — 88307 TISSUE EXAM BY PATHOLOGIST: CPT | Performed by: PATHOLOGY

## 2022-05-21 PROCEDURE — 86592 SYPHILIS TEST NON-TREP QUAL: CPT | Performed by: OBSTETRICS & GYNECOLOGY

## 2022-05-21 PROCEDURE — 80053 COMPREHEN METABOLIC PANEL: CPT | Performed by: OBSTETRICS & GYNECOLOGY

## 2022-05-21 PROCEDURE — 11000001 HC ACUTE MED/SURG PRIVATE ROOM

## 2022-05-21 PROCEDURE — 99211 OFF/OP EST MAY X REQ PHY/QHP: CPT | Mod: TH

## 2022-05-21 PROCEDURE — 59409 OBSTETRICAL CARE: CPT | Mod: AT,,, | Performed by: OBSTETRICS & GYNECOLOGY

## 2022-05-21 PROCEDURE — 85025 COMPLETE CBC W/AUTO DIFF WBC: CPT | Performed by: OBSTETRICS & GYNECOLOGY

## 2022-05-21 PROCEDURE — 36415 COLL VENOUS BLD VENIPUNCTURE: CPT | Performed by: OBSTETRICS & GYNECOLOGY

## 2022-05-21 PROCEDURE — 88307 PR  SURG PATH,LEVEL V: ICD-10-PCS | Mod: 26,,, | Performed by: PATHOLOGY

## 2022-05-21 PROCEDURE — 59409 PR OBSTETRICAL CARE,VAG DELIV ONLY: ICD-10-PCS | Mod: AT,,, | Performed by: OBSTETRICS & GYNECOLOGY

## 2022-05-21 PROCEDURE — 72200005 HC VAGINAL DELIVERY LEVEL II

## 2022-05-21 PROCEDURE — 86850 RBC ANTIBODY SCREEN: CPT | Performed by: OBSTETRICS & GYNECOLOGY

## 2022-05-21 PROCEDURE — 72100002 HC LABOR CARE, 1ST 8 HOURS

## 2022-05-21 PROCEDURE — 88307 TISSUE EXAM BY PATHOLOGIST: CPT | Mod: 26,,, | Performed by: PATHOLOGY

## 2022-05-21 PROCEDURE — 63600175 PHARM REV CODE 636 W HCPCS: Performed by: OBSTETRICS & GYNECOLOGY

## 2022-05-21 PROCEDURE — 80074 ACUTE HEPATITIS PANEL: CPT | Performed by: OBSTETRICS & GYNECOLOGY

## 2022-05-21 RX ORDER — MISOPROSTOL 100 UG/1
100 TABLET ORAL EVERY 6 HOURS
Status: DISCONTINUED | OUTPATIENT
Start: 2022-05-21 | End: 2022-05-21

## 2022-05-21 RX ORDER — PRENATAL WITH FERROUS FUM AND FOLIC ACID 3080; 920; 120; 400; 22; 1.84; 3; 20; 10; 1; 12; 200; 27; 25; 2 [IU]/1; [IU]/1; MG/1; [IU]/1; MG/1; MG/1; MG/1; MG/1; MG/1; MG/1; UG/1; MG/1; MG/1; MG/1; MG/1
1 TABLET ORAL DAILY
Status: DISCONTINUED | OUTPATIENT
Start: 2022-05-22 | End: 2022-05-23

## 2022-05-21 RX ORDER — ONDANSETRON 8 MG/1
8 TABLET, ORALLY DISINTEGRATING ORAL EVERY 8 HOURS PRN
Status: DISCONTINUED | OUTPATIENT
Start: 2022-05-21 | End: 2022-05-25 | Stop reason: HOSPADM

## 2022-05-21 RX ORDER — SODIUM CHLORIDE, SODIUM LACTATE, POTASSIUM CHLORIDE, CALCIUM CHLORIDE 600; 310; 30; 20 MG/100ML; MG/100ML; MG/100ML; MG/100ML
INJECTION, SOLUTION INTRAVENOUS CONTINUOUS
Status: DISCONTINUED | OUTPATIENT
Start: 2022-05-21 | End: 2022-05-23

## 2022-05-21 RX ORDER — DIPHENHYDRAMINE HYDROCHLORIDE 50 MG/ML
25 INJECTION INTRAMUSCULAR; INTRAVENOUS EVERY 4 HOURS PRN
Status: DISCONTINUED | OUTPATIENT
Start: 2022-05-21 | End: 2022-05-25 | Stop reason: HOSPADM

## 2022-05-21 RX ORDER — IBUPROFEN 600 MG/1
600 TABLET ORAL EVERY 6 HOURS
Status: DISCONTINUED | OUTPATIENT
Start: 2022-05-21 | End: 2022-05-25 | Stop reason: HOSPADM

## 2022-05-21 RX ORDER — OXYCODONE AND ACETAMINOPHEN 10; 325 MG/1; MG/1
1 TABLET ORAL EVERY 4 HOURS PRN
Status: DISCONTINUED | OUTPATIENT
Start: 2022-05-21 | End: 2022-05-25 | Stop reason: HOSPADM

## 2022-05-21 RX ORDER — OXYTOCIN/RINGER'S LACTATE 30/500 ML
95 PLASTIC BAG, INJECTION (ML) INTRAVENOUS CONTINUOUS
Status: DISCONTINUED | OUTPATIENT
Start: 2022-05-21 | End: 2022-05-21

## 2022-05-21 RX ORDER — OXYTOCIN/RINGER'S LACTATE 30/500 ML
334 PLASTIC BAG, INJECTION (ML) INTRAVENOUS CONTINUOUS
Status: DISCONTINUED | OUTPATIENT
Start: 2022-05-21 | End: 2022-05-21

## 2022-05-21 RX ORDER — HYDROCORTISONE 25 MG/G
CREAM TOPICAL 3 TIMES DAILY PRN
Status: DISCONTINUED | OUTPATIENT
Start: 2022-05-21 | End: 2022-05-25 | Stop reason: HOSPADM

## 2022-05-21 RX ORDER — SIMETHICONE 80 MG
1 TABLET,CHEWABLE ORAL EVERY 6 HOURS PRN
Status: DISCONTINUED | OUTPATIENT
Start: 2022-05-21 | End: 2022-05-25 | Stop reason: HOSPADM

## 2022-05-21 RX ORDER — DOCUSATE SODIUM 100 MG/1
200 CAPSULE, LIQUID FILLED ORAL 2 TIMES DAILY PRN
Status: DISCONTINUED | OUTPATIENT
Start: 2022-05-21 | End: 2022-05-25 | Stop reason: HOSPADM

## 2022-05-21 RX ORDER — ACETAMINOPHEN 325 MG/1
650 TABLET ORAL EVERY 6 HOURS PRN
Status: DISCONTINUED | OUTPATIENT
Start: 2022-05-21 | End: 2022-05-24

## 2022-05-21 RX ORDER — CLINDAMYCIN PHOSPHATE 900 MG/50ML
900 INJECTION, SOLUTION INTRAVENOUS
Status: DISCONTINUED | OUTPATIENT
Start: 2022-05-21 | End: 2022-05-23

## 2022-05-21 RX ORDER — HYDROMORPHONE HYDROCHLORIDE 2 MG/ML
INJECTION, SOLUTION INTRAMUSCULAR; INTRAVENOUS; SUBCUTANEOUS
Status: COMPLETED
Start: 2022-05-21 | End: 2022-05-21

## 2022-05-21 RX ORDER — PROCHLORPERAZINE EDISYLATE 5 MG/ML
5 INJECTION INTRAMUSCULAR; INTRAVENOUS EVERY 6 HOURS PRN
Status: DISCONTINUED | OUTPATIENT
Start: 2022-05-21 | End: 2022-05-25 | Stop reason: HOSPADM

## 2022-05-21 RX ORDER — MISOPROSTOL 200 UG/1
800 TABLET ORAL ONCE
Status: COMPLETED | OUTPATIENT
Start: 2022-05-21 | End: 2022-05-21

## 2022-05-21 RX ORDER — DIPHENHYDRAMINE HCL 25 MG
25 CAPSULE ORAL EVERY 4 HOURS PRN
Status: DISCONTINUED | OUTPATIENT
Start: 2022-05-21 | End: 2022-05-25 | Stop reason: HOSPADM

## 2022-05-21 RX ORDER — HYDROMORPHONE HYDROCHLORIDE 1 MG/ML
1 INJECTION, SOLUTION INTRAMUSCULAR; INTRAVENOUS; SUBCUTANEOUS EVERY 6 HOURS PRN
Status: DISCONTINUED | OUTPATIENT
Start: 2022-05-21 | End: 2022-05-25 | Stop reason: HOSPADM

## 2022-05-21 RX ORDER — HYDROCODONE BITARTRATE AND ACETAMINOPHEN 5; 325 MG/1; MG/1
1 TABLET ORAL EVERY 4 HOURS PRN
Status: DISCONTINUED | OUTPATIENT
Start: 2022-05-21 | End: 2022-05-25 | Stop reason: HOSPADM

## 2022-05-21 RX ADMIN — GENTAMICIN SULFATE 332 MG: 40 INJECTION, SOLUTION INTRAMUSCULAR; INTRAVENOUS at 10:05

## 2022-05-21 RX ADMIN — HYDROCODONE BITARTRATE AND ACETAMINOPHEN 1 TABLET: 5; 325 TABLET ORAL at 07:05

## 2022-05-21 RX ADMIN — Medication 95 MILLI-UNITS/MIN: at 05:05

## 2022-05-21 RX ADMIN — SODIUM CHLORIDE, SODIUM LACTATE, POTASSIUM CHLORIDE, AND CALCIUM CHLORIDE: .6; .31; .03; .02 INJECTION, SOLUTION INTRAVENOUS at 10:05

## 2022-05-21 RX ADMIN — AMPICILLIN 2 G: 2 INJECTION, POWDER, FOR SOLUTION INTRAMUSCULAR; INTRAVENOUS at 04:05

## 2022-05-21 RX ADMIN — CLINDAMYCIN IN 5 PERCENT DEXTROSE 900 MG: 18 INJECTION, SOLUTION INTRAVENOUS at 04:05

## 2022-05-21 RX ADMIN — MISOPROSTOL 800 MCG: 200 TABLET ORAL at 04:05

## 2022-05-21 RX ADMIN — HYDROMORPHONE HYDROCHLORIDE: 2 INJECTION INTRAMUSCULAR; INTRAVENOUS; SUBCUTANEOUS at 05:05

## 2022-05-21 NOTE — H&P
[facility] - Labor & Delivery  Obstetrics  History & Physical    Patient Name: Latisha Guzmán  MRN: [id number]  Admission Date: 2022  Primary Care Provider: Debra Tiwari MD    Subjective:     Principal Problem:Fetal demise affecting delivery    History of Present Illness:  Latisha Guzmán is a 37 y.o.  female with IUP at Unknown weeks gestation who presented to OSH with complaint of LOF (purulent), vaginal bleeding and abdominal and back pain. Reported a fever of 103F at home. Pt states she did not know she was pregnant.  Pt had a ultrasound performed but they were able to confirm a fetal demise but not able to get an accurate estimation of gestational age. Pt was noted to be complete in the ER and she was transferred to [facility] for delivery. Upon presentation,pt with moderate amount of foul smelling purulent amniotic fluid. Fetal head size consistent with ~35 week pregnancy.  This IUP is complicated by no prenatal care and tobacco use.      OB History    Para Term  AB Living   5 4 4 0 0 1   SAB IAB Ectopic Multiple Live Births   0 0 0 0 1      # Outcome Date GA Lbr Alonzo/2nd Weight Sex Delivery Anes PTL Lv   5 Current            4 Term 10/07/15 39w5d  3.177 kg (7 lb 0.1 oz) F Vag-Spont Spinal, EPI N FAVIOLA      Complications: Fetal Intolerance      Apgar1: 9  Apgar5: 9   3 Term            2 Term            1 Term              Past Medical History:   Diagnosis Date    Kidney stones     Mental disorder     anxiety     Past Surgical History:   Procedure Laterality Date    APPENDECTOMY      CYSTOSCOPY W/ URETERAL STENT PLACEMENT Left 2018    Procedure: CYSTOSCOPY, WITH URETERAL STENT INSERTION;  Surgeon: Teressa Mobley MD;  Location: [facility] OR [address] [place] [address];  Service: Urology;  Laterality: Left;    CYSTOSCOPY W/ URETERAL STENT REMOVAL Left 2018    Procedure: CYSTOSCOPY, WITH URETERAL STENT REMOVAL;  Surgeon: Teressa Mobley MD;  Location: [facility] OR [address] [place] [address];  Service: Urology;   Laterality: Left;    LASER LITHOTRIPSY Left 11/12/2018    Procedure: LITHOTRIPSY, USING LASER;  Surgeon: Teressa Mobley MD;  Location: [facility] OR [address] [place] [address];  Service: Urology;  Laterality: Left;    REPLACEMENT OF STENT Left 11/12/2018    Procedure: REPLACEMENT, STENT;  Surgeon: Teressa Mobley MD;  Location: [facility] OR [address] [place] [address];  Service: Urology;  Laterality: Left;    URETEROSCOPIC REMOVAL OF URETERIC CALCULUS Left 11/12/2018    Procedure: REMOVAL, CALCULUS, URETER, URETEROSCOPIC;  Surgeon: Teressa Mobley MD;  Location: [facility] OR [address] [place] [address];  Service: Urology;  Laterality: Left;  1 hour       PTA Medications   Medication Sig    HYDROcodone-acetaminophen (NORCO) 5-325 mg per tablet Take 1 tablet by mouth every 6 (six) hours as needed for Pain.    ibuprofen (ADVIL,MOTRIN) 600 MG tablet Take 1 tablet (600 mg total) by mouth every 6 (six) hours as needed for Pain.    tamsulosin (FLOMAX) 0.4 mg Cap Take 1 capsule (0.4 mg total) by mouth once daily.       Review of patient's allergies indicates:   Allergen Reactions    Toradol [ketorolac]     Tramadol Hives    Tramadol         Family History    None       Tobacco Use    Smoking status: Current Every Day Smoker     Packs/day: 0.50    Smokeless tobacco: Never Used   Substance and Sexual Activity    Alcohol use: No    Drug use: No    Sexual activity: Not on file     Review of Systems   Constitutional: Negative.    HENT: Negative.    Eyes: Negative.    Respiratory: Negative for cough and shortness of breath.    Cardiovascular: Negative for chest pain.   Gastrointestinal: Negative for blood in stool, diarrhea, nausea and vomiting.   Endocrine: Negative.    Integumentary:  Negative.   Neurological: Negative for syncope, numbness and headaches.   Psychiatric/Behavioral: Negative.       Objective:     Vital Signs (Most Recent):  Pulse: (!) 138 (05/21/22 1700)  BP: 125/64 (05/21/22 1700)  SpO2: (!) 90 % (05/21/22 1700) Vital Signs (24h Range):  Temp:  [98.3  °F (36.8 °C)] 98.3 °F (36.8 °C)  Pulse:  [120-144] 138  Resp:  [18-24] 24  SpO2:  [90 %-100 %] 90 %  BP: (125-174)/(64-95) 125/64        There is no height or weight on file to calculate BMI.  Physical Exam:   Constitutional: She is oriented to person, place, and time. She appears well-developed and well-nourished. No distress.    HENT:   Head: Normocephalic and atraumatic.       Pulmonary/Chest: Effort normal. She has no wheezes. She has no rales.        Abdominal: Soft.             Musculoskeletal: Moves all extremeties. No edema.       Neurological: She is alert and oriented to person, place, and time.    Skin: Skin is warm and dry.    Psychiatric: She has a normal mood and affect.       Cervix:     Significant Labs:  Lab Results   Component Value Date    GROUPTRH O POS 10/07/2015       CBC: No results for input(s): WBC, RBC, HGB, HCT, PLT, MCV, MCH, MCHC in the last 48 hours.  CMP: No results for input(s): GLU, CALCIUM, ALBUMIN, PROT, NA, K, CO2, CL, BUN, CREATININE, ALKPHOS, ALT, AST, BILITOT in the last 48 hours.  I have personallly reviewed all pertinent lab results from the last 24 hours.    Assessment/Plan:     37 y.o. female  at Unknown for fetal demise, chorio/sepsis    Active Diagnoses:    Diagnosis Date Noted POA    PRINCIPAL PROBLEM:  Fetal demise affecting delivery [O36.4XX0] 2022 Yes    Chorioamnionitis in third trimester [O41.1230] 2022 Yes    Sepsis [A41.9] 2018 Yes      Problems Resolved During this Admission:       - Admit to L&D  - Consents obtained  - pt set up for delivery  - f/u blood culture, will start on amp/gent/clindamycin to continue x 48hrs.     Beena Leigh MD  Obstetrics  [place] - Labor & Delivery

## 2022-05-21 NOTE — NURSING
Patient arrived to the floor by EMS at 1530 transfer from [facility].  Dr Leigh aware of the patient. Bilateral peripheral IV LR running on both free flow. Temp of 99.5.  Strong odor noted.  IUFD

## 2022-05-21 NOTE — L&D DELIVERY NOTE
[facility] - Labor & Delivery  Vaginal Delivery   Operative Note    SUMMARY     Normal spontaneous vaginal delivery of fetal demise. Infants cranium distorted, facial edema noted. Male infant placed on infant warmer bed. Infant delivered position OP over intact perineum.  Nuchal cord: Yes, cord reduced at perineum. After delivery gush of purulent fluid noted. 800mcg of cytotec placed rectally.     Spontaneous delivery of placenta and IV pitocin given noting good uterine tone.  No lacerations noted.  Patient tolerated delivery well. Sponge needle and lap counted correctly x2.    Indications: Fetal demise affecting delivery  Pregnancy complicated by:   Patient Active Problem List   Diagnosis    Back pain    Kidney stone    Sepsis    Left ureteral stone    Pyelonephritis    Ureteral stone    Fetal demise affecting delivery    Chorioamnionitis in third trimester     Admitting GA: Unknown    Delivery Information for Boy Latisha Guzmán    Birth information:  YOB: 2023   Time of birth: 4:27 PM   Sex: male   Head Delivery Date/Time: 5/21/2022  4:25 PM   Delivery type: Vaginal, Spontaneous   Gestational Age: <None>    Delivery Providers    Delivering clinician: Beena Leigh MD   Provider Role    [facility]    Ade Watson RN Registered Nurse            Measurements    Weight:   Length:          Apgars    Living status: Fetal Demise  Apgars:  1 min.:  5 min.:  10 min.:  15 min.:  20 min.:    Skin color:  0  0  0  0  0    Heart rate:  0  0  0  0  0    Reflex irritability:  0  0  0  0  0    Muscle tone:  0  0  0  0  0    Respiratory effort:  0  0  0  0  0    Total:  0  0  0  0  0           Operative Delivery    Forceps attempted?: No  Vacuum extractor attempted?: No         Shoulder Dystocia    Shoulder dystocia present?: No           Presentation    Presentation: Vertex  Position: Middle Occiput Posterior           Interventions/Resuscitation    Method: None       Cord     Vessels: 3 vessels  Gases Sent?: No  Stem Cell Collection (by MD): No       Placenta    Placenta delivery date/time: 2022 1634  Placenta removal: Spontaneous  Placenta appearance: Intact  Placenta disposition: pathology           Labor Events:       labor:       Labor Onset Date/Time:         Dilation Complete Date/Time: 2022 16:00     Start Pushing Date/Time: 2022 16:00       Start Pushing Date/Time: 2022 16:00     Rupture Date/Time:            Rupture type:          Fluid Amount:       Fluid Color:       Fluid Odor:       Membrane Status:                steroids:       Antibiotics given for GBS:       Induction:       Indications for induction:        Augmentation:       Indications for augmentation:       Labor complications:       Additional complications:          Cervical ripening:                     Delivery:      Episiotomy:       Indication for Episiotomy:       Perineal Lacerations:   Repaired:      Periurethral Laceration:   Repaired:     Labial Laceration:   Repaired:     Sulcus Laceration:   Repaired:     Vaginal Laceration:   Repaired:     Cervical Laceration:   Repaired:     Repair suture:       Repair # of packets:       Last Value - EBL - Nursing (mL):       Sum - EBL - Nursing (mL): 0     Last Value - EBL - Anesthesia (mL):      Calculated QBL (mL):       Vaginal Sweep Performed:       Surgicount Correct:         Other providers:       Anesthesia    Method: None          Details (if applicable):  Trial of Labor      Categorization:      Priority:     Indications for :     Incision Type:       Additional  information:  Forceps:    Vacuum:    Breech:    Observed anomalies    Other (Comments):             Beena Leigh MD, FACOG  OB/GYN

## 2022-05-21 NOTE — NURSING
[facility] notified of fetal demise. Unsuitable for donation by Jennifer Miranda. [facility] assigned number [phone]

## 2022-05-22 LAB
ANISOCYTOSIS BLD QL SMEAR: SLIGHT
BASOPHILS # BLD AUTO: 0.05 K/UL (ref 0–0.2)
BASOPHILS NFR BLD: 0.3 % (ref 0–1.9)
DIFFERENTIAL METHOD: ABNORMAL
EOSINOPHIL # BLD AUTO: 0.1 K/UL (ref 0–0.5)
EOSINOPHIL NFR BLD: 0.7 % (ref 0–8)
ERYTHROCYTE [DISTWIDTH] IN BLOOD BY AUTOMATED COUNT: 18.6 % (ref 11.5–14.5)
GENTAMICIN SERPL-MCNC: 5 UG/ML
GENTAMICIN TROUGH SERPL-MCNC: 0.7 UG/ML (ref 0–2)
HCT VFR BLD AUTO: 23.5 % (ref 37–48.5)
HGB BLD-MCNC: 7.4 G/DL (ref 12–16)
HYPOCHROMIA BLD QL SMEAR: ABNORMAL
IMM GRANULOCYTES # BLD AUTO: 0.22 K/UL (ref 0–0.04)
IMM GRANULOCYTES NFR BLD AUTO: 1.2 % (ref 0–0.5)
LYMPHOCYTES # BLD AUTO: 2.2 K/UL (ref 1–4.8)
LYMPHOCYTES NFR BLD: 12 % (ref 18–48)
MCH RBC QN AUTO: 22 PG (ref 27–31)
MCHC RBC AUTO-ENTMCNC: 31.5 G/DL (ref 32–36)
MCV RBC AUTO: 70 FL (ref 82–98)
MONOCYTES # BLD AUTO: 0.5 K/UL (ref 0.3–1)
MONOCYTES NFR BLD: 2.6 % (ref 4–15)
NEUTROPHILS # BLD AUTO: 15.6 K/UL (ref 1.8–7.7)
NEUTROPHILS NFR BLD: 83.2 % (ref 38–73)
NRBC BLD-RTO: 0 /100 WBC
PLATELET # BLD AUTO: 472 K/UL (ref 150–450)
PLATELET BLD QL SMEAR: ABNORMAL
PMV BLD AUTO: 8.9 FL (ref 9.2–12.9)
POLYCHROMASIA BLD QL SMEAR: ABNORMAL
RBC # BLD AUTO: 3.37 M/UL (ref 4–5.4)
RPR SER QL: NORMAL
WBC # BLD AUTO: 18.74 K/UL (ref 3.9–12.7)

## 2022-05-22 PROCEDURE — 72200004 HC VAGINAL DELIVERY LEVEL I

## 2022-05-22 PROCEDURE — 36415 COLL VENOUS BLD VENIPUNCTURE: CPT | Performed by: OBSTETRICS & GYNECOLOGY

## 2022-05-22 PROCEDURE — 85025 COMPLETE CBC W/AUTO DIFF WBC: CPT | Performed by: OBSTETRICS & GYNECOLOGY

## 2022-05-22 PROCEDURE — 80170 ASSAY OF GENTAMICIN: CPT | Mod: 91 | Performed by: OBSTETRICS & GYNECOLOGY

## 2022-05-22 PROCEDURE — 63600175 PHARM REV CODE 636 W HCPCS: Performed by: OBSTETRICS & GYNECOLOGY

## 2022-05-22 PROCEDURE — 11000001 HC ACUTE MED/SURG PRIVATE ROOM

## 2022-05-22 PROCEDURE — 25000003 PHARM REV CODE 250: Performed by: OBSTETRICS & GYNECOLOGY

## 2022-05-22 PROCEDURE — 72100002 HC LABOR CARE, 1ST 8 HOURS

## 2022-05-22 PROCEDURE — 80170 ASSAY OF GENTAMICIN: CPT | Performed by: OBSTETRICS & GYNECOLOGY

## 2022-05-22 RX ORDER — MISOPROSTOL 200 UG/1
200 TABLET ORAL EVERY 6 HOURS
Status: COMPLETED | OUTPATIENT
Start: 2022-05-22 | End: 2022-05-23

## 2022-05-22 RX ORDER — LANOLIN ALCOHOL/MO/W.PET/CERES
1 CREAM (GRAM) TOPICAL 2 TIMES DAILY
Status: DISCONTINUED | OUTPATIENT
Start: 2022-05-22 | End: 2022-05-25 | Stop reason: HOSPADM

## 2022-05-22 RX ADMIN — IBUPROFEN 600 MG: 600 TABLET ORAL at 12:05

## 2022-05-22 RX ADMIN — AMPICILLIN 2 G: 2 INJECTION, POWDER, FOR SOLUTION INTRAMUSCULAR; INTRAVENOUS at 06:05

## 2022-05-22 RX ADMIN — GENTAMICIN SULFATE 332 MG: 40 INJECTION, SOLUTION INTRAMUSCULAR; INTRAVENOUS at 09:05

## 2022-05-22 RX ADMIN — AMPICILLIN 2 G: 2 INJECTION, POWDER, FOR SOLUTION INTRAMUSCULAR; INTRAVENOUS at 12:05

## 2022-05-22 RX ADMIN — AMPICILLIN 2 G: 2 INJECTION, POWDER, FOR SOLUTION INTRAMUSCULAR; INTRAVENOUS at 11:05

## 2022-05-22 RX ADMIN — AMPICILLIN 2 G: 2 INJECTION, POWDER, FOR SOLUTION INTRAMUSCULAR; INTRAVENOUS at 05:05

## 2022-05-22 RX ADMIN — PRENATAL VIT W/ FE FUMARATE-FA TAB 27-0.8 MG 1 TABLET: 27-0.8 TAB at 08:05

## 2022-05-22 RX ADMIN — MISOPROSTOL 200 MCG: 200 TABLET ORAL at 11:05

## 2022-05-22 RX ADMIN — SODIUM CHLORIDE, SODIUM LACTATE, POTASSIUM CHLORIDE, AND CALCIUM CHLORIDE: .6; .31; .03; .02 INJECTION, SOLUTION INTRAVENOUS at 05:05

## 2022-05-22 RX ADMIN — IBUPROFEN 600 MG: 600 TABLET ORAL at 05:05

## 2022-05-22 RX ADMIN — CLINDAMYCIN IN 5 PERCENT DEXTROSE 900 MG: 18 INJECTION, SOLUTION INTRAVENOUS at 05:05

## 2022-05-22 RX ADMIN — CLINDAMYCIN IN 5 PERCENT DEXTROSE 900 MG: 18 INJECTION, SOLUTION INTRAVENOUS at 01:05

## 2022-05-22 RX ADMIN — HYDROCODONE BITARTRATE AND ACETAMINOPHEN 1 TABLET: 5; 325 TABLET ORAL at 08:05

## 2022-05-22 RX ADMIN — IBUPROFEN 600 MG: 600 TABLET ORAL at 11:05

## 2022-05-22 RX ADMIN — FERROUS SULFATE TAB 325 MG (65 MG ELEMENTAL FE) 1 EACH: 325 (65 FE) TAB at 09:05

## 2022-05-22 RX ADMIN — CLINDAMYCIN IN 5 PERCENT DEXTROSE 900 MG: 18 INJECTION, SOLUTION INTRAVENOUS at 08:05

## 2022-05-22 RX ADMIN — IBUPROFEN 600 MG: 600 TABLET ORAL at 06:05

## 2022-05-22 RX ADMIN — MISOPROSTOL 200 MCG: 200 TABLET ORAL at 05:05

## 2022-05-22 NOTE — NURSING
"Late entry  1600- Entered patient's room with Dr Leigh for delivery of infant. Extremely foul odor present in patient's room, brown discharge leaking from vagina. Patient declines epidural at this time, wishes to "just get it over with". Stillborn male infant delivered in OP position with nuchal cord reduced by MD. Edematous facial features and skull non-intact. Delivery of infant followed by gush of purulent brown discharge. Infant placed on warmer per pt request. Uterus firm, at the umbilicus, light rubra. Ampicillin and clindamycin administered at 1610. Rectal cytotec 800mcg administered by Dr Leigh.  "

## 2022-05-22 NOTE — NURSING
Spoke with Nathaniel in security, requested security to unit to  fetal remains and transport to [facility].

## 2022-05-22 NOTE — NURSING
Spoke with patient about wishes for disposition of infant's body. Patient wishes infant to be disposed of by lab. Declines CBIB and private burial.

## 2022-05-22 NOTE — PROGRESS NOTES
[facility] - Labor & Delivery  Obstetrics  Postpartum Progress Note    Patient Name: Latisha Guzmán  MRN: [id number]  Admission Date: 5/21/2022  Hospital Length of Stay: 1 days  Attending Physician: Beena Leigh MD  Primary Care Provider: Debra Tiwari MD    Subjective:     Principal Problem:Fetal demise affecting delivery    Hospital course:   Latisha Guzmán is a 37 y.o. female PPD #1 status post Spontaneous vaginal delivery. Patient admitted for management of fetal demise and sepsis.    Interval History:   She is doing well this morning. Denies fever. She is tolerating a regular diet without nausea or vomiting. She is voiding spontaneously. She is ambulating.  Vaginal bleeding is mild (states she is changing Q 2hrs).  She denies fever or chills. Abdominal pain is mild and controlled with oral medications.      Objective:     Vital Signs (Most Recent):  Temp: 98.6 °F (37 °C) (05/22/22 0604)  Pulse: 89 (05/22/22 0745)  Resp: 17 (05/22/22 0818)  BP: 110/85 (05/22/22 0745)  SpO2: 96 % (05/22/22 0745) Vital Signs (24h Range):  Temp:  [98.3 °F (36.8 °C)-98.9 °F (37.2 °C)] 98.6 °F (37 °C)  Pulse:  [] 89  Resp:  [17-24] 17  SpO2:  [90 %-100 %] 96 %  BP: (110-174)/(58-95) 110/85     Weight: 77.1 kg (169 lb 15.6 oz)  Body mass index is 27.43 kg/m².    No intake or output data in the 24 hours ending 05/22/22 0836    Physical Exam:   Constitutional: She is oriented to person, place, and time. She appears well-developed and well-nourished. No distress.    HENT:   Head: Normocephalic and atraumatic.       Pulmonary/Chest: Effort normal.        Abdominal: Soft.   Fundus-nontender             Musculoskeletal: Moves all extremeties. No edema.       Neurological: She is alert and oriented to person, place, and time.    Skin: Skin is warm and dry.    Psychiatric: She has a normal mood and affect.       Significant Labs:  Lab Results   Component Value Date    GROUPTRH O POS 05/21/2022     Recent Labs   Lab 05/22/22  0648   HGB  7.4*   HCT 23.5*       CBC:   Recent Labs   Lab 22  0648   WBC 18.74*   RBC 3.37*   HGB 7.4*   HCT 23.5*   *   MCV 70*   MCH 22.0*   MCHC 31.5*     I have personallly reviewed all pertinent lab results from the last 24 hours.    Assessment/Plan:     37 y.o. female  at Unknown for:    Active Diagnoses:    Diagnosis Date Noted POA    PRINCIPAL PROBLEM:  Fetal demise affecting delivery [O36.4XX0] 2022 Yes    Chorioamnionitis in third trimester [O41.1230] 2022 Yes    Sepsis [A41.9] 2018 Yes      Problems Resolved During this Admission:       1. Postpartum care:  - Patient doing well. Continue routine management and advances.  - Continue PO pain meds. Pain well controlled.  - Encourage ambulation    2. Sepsis- fetal demise/chorio  -WBC improving. Continue amp/gent/clinda for 48 hours and will transition to oral regimen to complete x 14 days of therapy.   - order for cytotec to take 200mcg Q 6hr x 4 doses.   - F/u AM labs, iron ordered        Disposition: As patient meets milestones, will plan to discharge POD#2/3.    Beena Leigh MD  Obstetrics  [facility] - Labor & Delivery

## 2022-05-22 NOTE — NURSING
of [place] [facility] notified of fetal demise.  released body to parental disposition.

## 2022-05-22 NOTE — PROGRESS NOTES
Pharmacokinetic Follow Up: Gentamicin    Assessment of levels:   Random concentration of 5 mcg/mL (8.75 hours post-infusion) corresponds to a dosing interval of every 24 hours per the [facility] Nom[facility]    Regimen Plan:   Continue current dose: Gentamicin 332 mg IV every 24 hours    Drug levels (last 3 results):  No results for input(s): AMIKACINPEAK, AMIKACINTROU, AMIKACINRAND, AMIKACIN in the last 72 hours.    No results for input(s): GENTAMICIN, GENTPEAK, GENTTROUGH, GENT10, GENT12, GENT8, GENTRANDOM in the last 72 hours.    No results for input(s): TOBRA8, TOBRA10, TOBRA12, TOBRARND, TOBRAMYCIN, TOBRAPEAK, TOBRATROUGH, TOBRAMYCINPE, TOBRAMYCINRA, TOBRAMYCINTR in the last 72 hours.    Pharmacy will continue to monitor.    Please contact pharmacy at extension [phone] with any questions regarding this assessment.    Thank you for the consult,   Guero Peña      Patient brief summary:  Latisha Guzmán is a 37 y.o. female initiated on aminoglycoside therapy for treatment of  Chorio sepsis    Drug Allergies:   Review of patient's allergies indicates:   Allergen Reactions    Toradol [ketorolac]     Tramadol Hives    Tramadol        Actual Body Weight:   77.1 kg    Adjust Body Weight:   66.4 kg    Ideal Body Weight:  59.3 kg    Renal Function:   Estimated Creatinine Clearance: 88.8 mL/min (based on SCr of 0.9 mg/dL).,     Dialysis Method (if applicable):  N/A    CBC (last 72 hours):  Recent Labs   Lab Result Units 05/21/22  1752 05/21/22  1958 05/22/22  0648   WBC K/uL 14.89* 24.14* 18.74*   Hemoglobin g/dL 8.3* 8.1* 7.4*   Hematocrit % 27.5* 25.6* 23.5*   Platelets K/uL 484* 559* 472*   Gran % % 93.8* 93.1* 83.2*   Lymph % % 3.8* 4.5* 12.0*   Mono % % 0.6* 0.8* 2.6*   Eosinophil % % 0.1 0.1 0.7   Basophil % % 0.6 0.4 0.3   Differential Method  Automated Automated Automated       Metabolic Panel (last 72 hours):  Recent Labs   Lab Result Units 05/21/22  1752   Sodium mmol/L 139   Potassium mmol/L 3.7   Chloride mmol/L  105   CO2 mmol/L 17*   Glucose mg/dL 83   BUN mg/dL 20   Creatinine mg/dL 0.9   Albumin g/dL 1.7*   Total Bilirubin mg/dL 0.4   Alkaline Phosphatase U/L 399*   AST U/L 15   ALT U/L 8*       Aminoglycoside Administrations:  aminoglycosides given in last 96 hours                     gentamicin (GARAMYCIN) 332 mg in sodium chloride 0.9% 100 mL IVPB (mg) 332 mg New Bag 05/21/22 7531                    Microbiologic Results:  Microbiology Results (last 7 days)       ** No results found for the last 168 hours. **

## 2022-05-22 NOTE — NURSING
Pawan with security to unit for transportation of fetal remains. Decedent handoff sheet and stillbirth/demise tab printed and sent with infant to [facility] [place].

## 2022-05-22 NOTE — PROGRESS NOTES
Pharmacokinetic Initial Assessment: Gentamicin    Assessment:  Weight utilized for dose calculation: Adjusted Body Weight  Dosing method utilized: extended interval dosing    Plan: Extended interval dosing regimen: Gentamicin 322 mg IV once, followed by a random level to be drawn on 5-22-22 at 0700, 8-12 hours after the first dose.    Pharmacy will continue to monitor.    Please contact pharmacy at extension [phone] with any questions regarding this assessment.    Thank you for the consult,    Guero Peña       Patient brief summary:  Latisha Guzmán is a 37 y.o. female initiated on aminoglycoside therapy for treatment of suspected  Chorio sepsis    Drug Allergies:   Review of patient's allergies indicates:   Allergen Reactions    Toradol [ketorolac]     Tramadol Hives    Tramadol        Actual Body Weight:   77.1 kg    Adjust Body Weight:   66.4 kg    Ideal Body Weight:  59.3 kg    Renal Function:   Estimated Creatinine Clearance: 88.8 mL/min (based on SCr of 0.9 mg/dL).,     Dialysis Method (if applicable):  N/A    CBC (last 72 hours):  Recent Labs   Lab Result Units 05/21/22  1752   WBC K/uL 14.89*   Hemoglobin g/dL 8.3*   Hematocrit % 27.5*   Platelets K/uL 484*   Gran % % 93.8*   Lymph % % 3.8*   Mono % % 0.6*   Eosinophil % % 0.1   Basophil % % 0.6   Differential Method  Automated       Metabolic Panel (last 72 hours):  Recent Labs   Lab Result Units 05/21/22  1752   Sodium mmol/L 139   Potassium mmol/L 3.7   Chloride mmol/L 105   CO2 mmol/L 17*   Glucose mg/dL 83   BUN mg/dL 20   Creatinine mg/dL 0.9   Albumin g/dL 1.7*   Total Bilirubin mg/dL 0.4   Alkaline Phosphatase U/L 399*   AST U/L 15   ALT U/L 8*       Microbiologic Results:  Microbiology Results (last 7 days)       ** No results found for the last 168 hours. **

## 2022-05-22 NOTE — PLAN OF CARE
Patient rested comfortably in between care, up to shower. Foot prints captured on demise card.  Baby removed from room while patient showering. Linens changed. All questions and concerns have been addressed, patient resting in bed following shower. Call bell within reach will continue to monitor    Problem: Adjustment to Illness (Sepsis/Septic Shock)  Goal: Optimal Coping  Outcome: Ongoing, Progressing     Problem: Bleeding (Sepsis/Septic Shock)  Goal: Absence of Bleeding  Outcome: Ongoing, Progressing     Problem: Glycemic Control Impaired (Sepsis/Septic Shock)  Goal: Blood Glucose Level Within Desired Range  Outcome: Ongoing, Progressing     Problem: Infection Progression (Sepsis/Septic Shock)  Goal: Absence of Infection Signs and Symptoms  Outcome: Ongoing, Progressing     Problem: Nutrition Impaired (Sepsis/Septic Shock)  Goal: Optimal Nutrition Intake  Outcome: Ongoing, Progressing

## 2022-05-22 NOTE — NURSING
"1800- Patient ambulating santiago with friend. Patient found by back elevator attempting to go down elevator. When questioned, patient stated she wanted to go downstairs for a snack. Informed patient snacks are available through nursing and in vending machine in waiting room. Patient ambulated toward center elevator. Patient and friend exited to center elevator and attempted again to go downstairs. Stopped patient and informed patient cannot leave floor without MD order. Patient stated "I just want to walk around a little". Encouraged patient to ambulate, but stay on 3rd floor. Patient reluctantly returned to room.  "

## 2022-05-23 ENCOUNTER — CLINICAL SUPPORT (OUTPATIENT)
Dept: SMOKING CESSATION | Facility: CLINIC | Age: 37
End: 2022-05-23

## 2022-05-23 DIAGNOSIS — F17.210 CIGARETTE SMOKER: Primary | ICD-10-CM

## 2022-05-23 LAB
BASOPHILS # BLD AUTO: 0.03 K/UL (ref 0–0.2)
BASOPHILS NFR BLD: 0.3 % (ref 0–1.9)
DIFFERENTIAL METHOD: ABNORMAL
EOSINOPHIL # BLD AUTO: 0.2 K/UL (ref 0–0.5)
EOSINOPHIL NFR BLD: 2.1 % (ref 0–8)
ERYTHROCYTE [DISTWIDTH] IN BLOOD BY AUTOMATED COUNT: 18.7 % (ref 11.5–14.5)
HCT VFR BLD AUTO: 22.9 % (ref 37–48.5)
HGB BLD-MCNC: 7.4 G/DL (ref 12–16)
IMM GRANULOCYTES # BLD AUTO: 0.16 K/UL (ref 0–0.04)
IMM GRANULOCYTES NFR BLD AUTO: 1.6 % (ref 0–0.5)
LYMPHOCYTES # BLD AUTO: 2.7 K/UL (ref 1–4.8)
LYMPHOCYTES NFR BLD: 27 % (ref 18–48)
MCH RBC QN AUTO: 22.2 PG (ref 27–31)
MCHC RBC AUTO-ENTMCNC: 32.3 G/DL (ref 32–36)
MCV RBC AUTO: 69 FL (ref 82–98)
MONOCYTES # BLD AUTO: 0.7 K/UL (ref 0.3–1)
MONOCYTES NFR BLD: 6.9 % (ref 4–15)
NEUTROPHILS # BLD AUTO: 6.2 K/UL (ref 1.8–7.7)
NEUTROPHILS NFR BLD: 62.1 % (ref 38–73)
NRBC BLD-RTO: 0 /100 WBC
PLATELET # BLD AUTO: 494 K/UL (ref 150–450)
PMV BLD AUTO: 8.7 FL (ref 9.2–12.9)
RBC # BLD AUTO: 3.34 M/UL (ref 4–5.4)
WBC # BLD AUTO: 9.93 K/UL (ref 3.9–12.7)

## 2022-05-23 PROCEDURE — 36415 COLL VENOUS BLD VENIPUNCTURE: CPT | Performed by: OBSTETRICS & GYNECOLOGY

## 2022-05-23 PROCEDURE — 25000003 PHARM REV CODE 250: Performed by: OBSTETRICS & GYNECOLOGY

## 2022-05-23 PROCEDURE — 99406 PT REFUSED TOBACCO CESSATION: ICD-10-PCS | Mod: S$GLB,,,

## 2022-05-23 PROCEDURE — 11000001 HC ACUTE MED/SURG PRIVATE ROOM

## 2022-05-23 PROCEDURE — S4991 NICOTINE PATCH NONLEGEND: HCPCS | Performed by: OBSTETRICS & GYNECOLOGY

## 2022-05-23 PROCEDURE — 63600175 PHARM REV CODE 636 W HCPCS: Performed by: OBSTETRICS & GYNECOLOGY

## 2022-05-23 PROCEDURE — 87040 BLOOD CULTURE FOR BACTERIA: CPT | Performed by: OBSTETRICS & GYNECOLOGY

## 2022-05-23 PROCEDURE — 99406 BEHAV CHNG SMOKING 3-10 MIN: CPT | Mod: S$GLB,,,

## 2022-05-23 PROCEDURE — 99999 PR PBB SHADOW E&M-EST. PATIENT-LVL I: CPT | Mod: PBBFAC,,,

## 2022-05-23 PROCEDURE — 99999 PR PBB SHADOW E&M-EST. PATIENT-LVL I: ICD-10-PCS | Mod: PBBFAC,,,

## 2022-05-23 PROCEDURE — 85025 COMPLETE CBC W/AUTO DIFF WBC: CPT | Performed by: OBSTETRICS & GYNECOLOGY

## 2022-05-23 RX ORDER — IBUPROFEN 600 MG/1
600 TABLET ORAL EVERY 6 HOURS PRN
Qty: 30 TABLET | Refills: 0 | Status: SHIPPED | OUTPATIENT
Start: 2022-05-23

## 2022-05-23 RX ORDER — NIFEDIPINE 30 MG/1
30 TABLET, EXTENDED RELEASE ORAL NIGHTLY
Status: DISCONTINUED | OUTPATIENT
Start: 2022-05-23 | End: 2022-05-25

## 2022-05-23 RX ORDER — AMOXICILLIN AND CLAVULANATE POTASSIUM 875; 125 MG/1; MG/1
1 TABLET, FILM COATED ORAL EVERY 12 HOURS
Status: DISCONTINUED | OUTPATIENT
Start: 2022-05-23 | End: 2022-05-25 | Stop reason: HOSPADM

## 2022-05-23 RX ORDER — AMOXICILLIN AND CLAVULANATE POTASSIUM 875; 125 MG/1; MG/1
1 TABLET, FILM COATED ORAL EVERY 12 HOURS
Qty: 28 TABLET | Refills: 0 | Status: SHIPPED | OUTPATIENT
Start: 2022-05-23 | End: 2022-06-06

## 2022-05-23 RX ORDER — IBUPROFEN 200 MG
1 TABLET ORAL DAILY
Status: DISCONTINUED | OUTPATIENT
Start: 2022-05-23 | End: 2022-05-25 | Stop reason: HOSPADM

## 2022-05-23 RX ORDER — HYDROCODONE BITARTRATE AND ACETAMINOPHEN 5; 325 MG/1; MG/1
1 TABLET ORAL EVERY 6 HOURS PRN
Qty: 10 TABLET | Refills: 0 | Status: SHIPPED | OUTPATIENT
Start: 2022-05-23

## 2022-05-23 RX ORDER — FERROUS SULFATE 325(65) MG
325 TABLET, DELAYED RELEASE (ENTERIC COATED) ORAL 2 TIMES DAILY
Qty: 60 TABLET | Refills: 0 | Status: SHIPPED | OUTPATIENT
Start: 2022-05-23

## 2022-05-23 RX ADMIN — AMOXICILLIN AND CLAVULANATE POTASSIUM 1 TABLET: 875; 125 TABLET, FILM COATED ORAL at 03:05

## 2022-05-23 RX ADMIN — NIFEDIPINE 30 MG: 30 TABLET, FILM COATED, EXTENDED RELEASE ORAL at 11:05

## 2022-05-23 RX ADMIN — AMOXICILLIN AND CLAVULANATE POTASSIUM 1 TABLET: 875; 125 TABLET, FILM COATED ORAL at 08:05

## 2022-05-23 RX ADMIN — AMPICILLIN 2 G: 2 INJECTION, POWDER, FOR SOLUTION INTRAMUSCULAR; INTRAVENOUS at 12:05

## 2022-05-23 RX ADMIN — IBUPROFEN 600 MG: 600 TABLET ORAL at 06:05

## 2022-05-23 RX ADMIN — AMPICILLIN 2 G: 2 INJECTION, POWDER, FOR SOLUTION INTRAMUSCULAR; INTRAVENOUS at 06:05

## 2022-05-23 RX ADMIN — HYDROCODONE BITARTRATE AND ACETAMINOPHEN 1 TABLET: 5; 325 TABLET ORAL at 08:05

## 2022-05-23 RX ADMIN — CLINDAMYCIN IN 5 PERCENT DEXTROSE 900 MG: 18 INJECTION, SOLUTION INTRAVENOUS at 01:05

## 2022-05-23 RX ADMIN — FERROUS SULFATE TAB 325 MG (65 MG ELEMENTAL FE) 1 EACH: 325 (65 FE) TAB at 08:05

## 2022-05-23 RX ADMIN — MISOPROSTOL 200 MCG: 200 TABLET ORAL at 06:05

## 2022-05-23 RX ADMIN — HYDROCODONE BITARTRATE AND ACETAMINOPHEN 1 TABLET: 5; 325 TABLET ORAL at 05:05

## 2022-05-23 RX ADMIN — CLINDAMYCIN IN 5 PERCENT DEXTROSE 900 MG: 18 INJECTION, SOLUTION INTRAVENOUS at 08:05

## 2022-05-23 RX ADMIN — NICOTINE 1 PATCH: 14 PATCH, EXTENDED RELEASE TRANSDERMAL at 12:05

## 2022-05-23 RX ADMIN — IBUPROFEN 600 MG: 600 TABLET ORAL at 12:05

## 2022-05-23 RX ADMIN — IBUPROFEN 600 MG: 600 TABLET ORAL at 11:05

## 2022-05-23 RX ADMIN — PRENATAL VIT W/ FE FUMARATE-FA TAB 27-0.8 MG 1 TABLET: 27-0.8 TAB at 08:05

## 2022-05-23 NOTE — PROGRESS NOTES
Smoking cessation education note: Pt is a 0.5 to 0.75 pk/day cigarette smoker x 25 yrs. Order obtained for 14 mg nicotine patch Q day. Pt states ready to quit, and that she had been cutting down on her own prior to hospital admission. Ambulatory referral to Smoking Cessation Program following hospital discharge,.

## 2022-05-23 NOTE — PROGRESS NOTES
Pt initially set for discharge since Blood cultures were no growth to date but just receive call that + result. Orders placed for repeat blood cultures. Will transition to oral medication and cancel discharge.    Beena Leigh MD, FACOG  OB/GYN

## 2022-05-23 NOTE — NURSING
Pt resting, denies any problems or complaints.  Pt states she feels ok emotionally about fetal demise, pt states she is also going through a divorce which she has been experiencing some fighting.

## 2022-05-23 NOTE — DISCHARGE SUMMARY
"[facility] - Labor & Delivery  Obstetrics  Discharge Summary      Patient Name: Latisha Guzmán  MRN: [id number]  Admission Date: 2022  Hospital Length of Stay: 2 days  Discharge Date:   2022  Attending Physician: Tomasz Leigh MD   Discharging Provider: Tomasz Leigh MD  Primary Care Provider: Debra Tiwari MD      Hospital Course: Pt is a 37 y.o. now  by , PPD # 2 was admitted on 2022 for management of fetal demise, chorio, sepsis. On initial assessment, vital signs were stable- just noted to have tachycardia.  Patient was subsequently admitted to labor and delivery unit.  Patient subsequently delivered a non-viable infant, please see delivery information below or full delivery note for further details. Pt was in stable condition post delivery and was started on ampicillin, gent, clindamycin x 48hr with resolution of leukocytosis. Her postpartum course was uncomplicated. On discharge day, patient's pain is well  controlled with oral pain medications. Pt is tolerating ambulation without SOB or CP, and PO diet without N/V. Reports lochia is minimal in degree. Denies any HA, vision changes, F/C, LE swelling. Pt in stable condition and ready for discharge, instructed to continue PNV, pain medications, and to follow up in the OB clinic in 1 week with Dr. Leigh. She will continue po antibiotics x 14days.       Consults (From admission, onward)        Status Ordering Provider     Pharmacy to dose Aminoglycosides consult  Once        Provider:  (Not yet assigned)   "And" Linked Group Details    Acknowledged TOMASZ LEIGH          Final Active Diagnoses:    Diagnosis Date Noted POA    PRINCIPAL PROBLEM:  Fetal demise affecting delivery [O36.4XX0] 2022 Yes    Chorioamnionitis in third trimester [O41.1230] 2022 Yes    Sepsis [A41.9] 2018 Yes      Problems Resolved During this Admission:        Labs:   CBC   Recent Labs   Lab 228 22  0648 22  0548   WBC " 24.14* 18.74* 9.93   HGB 8.1* 7.4* 7.4*   HCT 25.6* 23.5* 22.9*   * 472* 494*       \    Immunizations     Date Immunization Status Dose Route/Site Given by    05/21/22 1754 MMR Incomplete 0.5 mL Subcutaneous/     05/21/22 1754 Tdap Incomplete 0.5 mL Intramuscular/           Delivery:    Episiotomy:     Lacerations:     Repair suture:     Repair # of packets:     Blood loss (ml):       Birth information:  YOB: 2023   Time of birth: 4:27 PM   Sex: male   Delivery type: Vaginal, Spontaneous   Gestational Age: <None>    Delivery Clinician:      Other providers:       Additional  information:  Forceps:    Vacuum:    Breech:    Observed anomalies      Living?:           APGARS  One minute Five minutes Ten minutes   Skin color:         Heart rate:         Grimace:         Muscle tone:         Breathing:         Totals: 0  0  0      Placenta: Delivered:       appearance    Pending Diagnostic Studies:     Procedure Component Value Units Date/Time    HIV 1/2 Ag/Ab (4th Gen) [[id number]] Collected: 05/21/22 1752    Order Status: Sent Lab Status: In process Updated: 05/22/22 0155    Specimen: Blood     Hepatitis panel, acute [[id number]] Collected: 05/21/22 1752    Order Status: Sent Lab Status: In process Updated: 05/22/22 0155    Specimen: Blood     Specimen to Pathology, Surgery Gynecology and Obstetrics [[id number]] Collected: 05/21/22 1839    Order Status: Sent Lab Status: In process Updated: 05/21/22 1840    Specimen: Tissue     Specimen to Pathology, Surgery Gynecology and Obstetrics [[id number]] Collected: 05/21/22 1641    Order Status: Sent Lab Status: In process Updated: 05/21/22 1642    Specimen: Tissue           Discharged Condition: good    Disposition: Home or Self Care    Follow Up:    Patient Instructions:      Diet Adult Regular     Pelvic Rest     Notify your health care provider if you experience any of the following:  temperature >100.4     Notify your health care provider if you  experience any of the following:  persistent nausea and vomiting or diarrhea     Notify your health care provider if you experience any of the following:  severe uncontrolled pain     Notify your health care provider if you experience any of the following:  difficulty breathing or increased cough     Notify your health care provider if you experience any of the following:  severe persistent headache     Notify your health care provider if you experience any of the following:  worsening rash     Notify your health care provider if you experience any of the following:  persistent dizziness, light-headedness, or visual disturbances     Notify your health care provider if you experience any of the following:  increased confusion or weakness     Medications:  Current Discharge Medication List      START taking these medications    Details   amoxicillin-clavulanate 875-125mg (AUGMENTIN) 875-125 mg per tablet Take 1 tablet by mouth every 12 (twelve) hours. for 14 days  Qty: 28 tablet, Refills: 0      ferrous sulfate 325 (65 FE) MG EC tablet Take 1 tablet (325 mg total) by mouth 2 (two) times daily.  Qty: 60 tablet, Refills: 0         CONTINUE these medications which have CHANGED    Details   HYDROcodone-acetaminophen (NORCO) 5-325 mg per tablet Take 1 tablet by mouth every 6 (six) hours as needed for Pain.  Qty: 10 tablet, Refills: 0    Comments: Quantity prescribed more than 7 day supply? No      ibuprofen (ADVIL,MOTRIN) 600 MG tablet Take 1 tablet (600 mg total) by mouth every 6 (six) hours as needed for Pain.  Qty: 30 tablet, Refills: 0         STOP taking these medications       tamsulosin (FLOMAX) 0.4 mg Cap Comments:   Reason for Stopping:               Beena Leigh MD  Obstetrics  [facility] - [facility] & Delivery

## 2022-05-23 NOTE — PROGRESS NOTES
[facility] - [facility] & Delivery  Obstetrics  Postpartum Progress Note    Patient Name: Latisha Guzmán  MRN: [id number]  Admission Date: 5/21/2022  Hospital Length of Stay: 2 days  Attending Physician: Beena Leigh MD  Primary Care Provider: Debra Tiwari MD    Subjective:     Principal Problem:Fetal demise affecting delivery    Hospital course:   Latisha Guzmán is a 37 y.o. female PPD #2 status post Spontaneous vaginal delivery. Patient admitted for management of fetal demise and sepsis.    Interval History:   She is doing well this morning. Denies fever. She is tolerating a regular diet without nausea or vomiting. She is voiding spontaneously. She is ambulating.  Vaginal bleeding is decreased from yesterday. Denies vaginal discharge or odor.  She denies fever or chills. Abdominal pain is mild and controlled with oral medications.      Objective:     Vital Signs (Most Recent):  Temp: 98.1 °F (36.7 °C) (05/22/22 1645)  Pulse: 89 (05/22/22 1645)  Resp: 17 (05/22/22 0818)  BP: (!) 127/58 (05/22/22 1645)  SpO2: 96 % (05/22/22 1645) Vital Signs (24h Range):  Temp:  [98.1 °F (36.7 °C)-99.5 °F (37.5 °C)] 98.1 °F (36.7 °C)  Pulse:  [] 89  Resp:  [17] 17  SpO2:  [91 %-96 %] 96 %  BP: (110-146)/(58-85) 127/58     Weight: 77.1 kg (169 lb 15.6 oz)  Body mass index is 27.43 kg/m².      Intake/Output Summary (Last 24 hours) at 5/23/2022 0753  Last data filed at 5/23/2022 0435  Gross per 24 hour   Intake 2706.75 ml   Output --   Net 2706.75 ml       Physical Exam:   Constitutional: She is oriented to person, place, and time. She appears well-developed and well-nourished. No distress.    HENT:   Head: Normocephalic and atraumatic.       Pulmonary/Chest: Effort normal.        Abdominal: Soft.   Fundus-firm             Musculoskeletal: Moves all extremeties. No edema.       Neurological: She is alert and oriented to person, place, and time.    Skin: Skin is warm and dry.    Psychiatric: She has a normal mood and affect.        Significant Labs:  Lab Results   Component Value Date    GROUPTRH O POS 2022     Recent Labs   Lab 22  0548   HGB 7.4*   HCT 22.9*       CBC:   Recent Labs   Lab 22  0548   WBC 9.93   RBC 3.34*   HGB 7.4*   HCT 22.9*   *   MCV 69*   MCH 22.2*   MCHC 32.3     I have personallly reviewed all pertinent lab results from the last 24 hours.    Assessment/Plan:     37 y.o. female  at Unknown for:    Active Diagnoses:    Diagnosis Date Noted POA    PRINCIPAL PROBLEM:  Fetal demise affecting delivery [O36.4XX0] 2022 Yes    Chorioamnionitis in third trimester [O41.1230] 2022 Yes    Sepsis [A41.9] 2018 Yes      Problems Resolved During this Admission:       1. Postpartum care:  - Patient doing well. Continue routine management and advances.  - Continue PO pain meds. Pain well controlled.  - Encourage ambulation    2. Sepsis- fetal demise/chorio  -leukocytosis resolved. 48 hrs of  amp/gent/clinda  Up at noon. Will transition to oral regimen to complete x 14 days of therapy.     Disposition: As patient meets milestones, will plan to discharge PPD#2 today with close f/u.    Beena Leigh MD  Obstetrics  [facility] - Labor & Delivery

## 2022-05-23 NOTE — NURSING
Microbiology called to report positive blood cultures.     Report given to Dr Leigh.     Ordering another culture x2

## 2022-05-24 ENCOUNTER — CLINICAL SUPPORT (OUTPATIENT)
Dept: SMOKING CESSATION | Facility: CLINIC | Age: 37
End: 2022-05-24

## 2022-05-24 DIAGNOSIS — F17.210 CIGARETTE SMOKER: Primary | ICD-10-CM

## 2022-05-24 LAB
HAV IGM SERPL QL IA: NEGATIVE
HBV CORE IGM SERPL QL IA: NEGATIVE
HBV SURFACE AG SERPL QL IA: NEGATIVE
HCV AB SERPL QL IA: POSITIVE
HIV 1+2 AB+HIV1 P24 AG SERPL QL IA: NEGATIVE

## 2022-05-24 PROCEDURE — 99406 BEHAV CHNG SMOKING 3-10 MIN: CPT | Mod: S$GLB,,,

## 2022-05-24 PROCEDURE — 25000003 PHARM REV CODE 250: Performed by: OBSTETRICS & GYNECOLOGY

## 2022-05-24 PROCEDURE — 11000001 HC ACUTE MED/SURG PRIVATE ROOM

## 2022-05-24 PROCEDURE — S4991 NICOTINE PATCH NONLEGEND: HCPCS | Performed by: OBSTETRICS & GYNECOLOGY

## 2022-05-24 PROCEDURE — 99406 PT REFUSED TOBACCO CESSATION: ICD-10-PCS | Mod: S$GLB,,,

## 2022-05-24 RX ORDER — IBUPROFEN 200 MG
1 TABLET ORAL DAILY
Qty: 28 PATCH | Refills: 0 | Status: SHIPPED | OUTPATIENT
Start: 2022-05-25

## 2022-05-24 RX ORDER — BUTALBITAL, ACETAMINOPHEN AND CAFFEINE 50; 325; 40 MG/1; MG/1; MG/1
2 TABLET ORAL ONCE
Status: COMPLETED | OUTPATIENT
Start: 2022-05-24 | End: 2022-05-24

## 2022-05-24 RX ORDER — BUTALBITAL, ACETAMINOPHEN AND CAFFEINE 50; 325; 40 MG/1; MG/1; MG/1
1 TABLET ORAL EVERY 4 HOURS PRN
Status: DISCONTINUED | OUTPATIENT
Start: 2022-05-24 | End: 2022-05-24

## 2022-05-24 RX ADMIN — FERROUS SULFATE TAB 325 MG (65 MG ELEMENTAL FE) 1 EACH: 325 (65 FE) TAB at 09:05

## 2022-05-24 RX ADMIN — IBUPROFEN 600 MG: 600 TABLET ORAL at 12:05

## 2022-05-24 RX ADMIN — IBUPROFEN 600 MG: 600 TABLET ORAL at 05:05

## 2022-05-24 RX ADMIN — HYDROCODONE BITARTRATE AND ACETAMINOPHEN 1 TABLET: 5; 325 TABLET ORAL at 12:05

## 2022-05-24 RX ADMIN — IBUPROFEN 600 MG: 600 TABLET ORAL at 06:05

## 2022-05-24 RX ADMIN — NICOTINE 1 PATCH: 14 PATCH, EXTENDED RELEASE TRANSDERMAL at 08:05

## 2022-05-24 RX ADMIN — HYDROCODONE BITARTRATE AND ACETAMINOPHEN 1 TABLET: 5; 325 TABLET ORAL at 05:05

## 2022-05-24 RX ADMIN — AMOXICILLIN AND CLAVULANATE POTASSIUM 1 TABLET: 875; 125 TABLET, FILM COATED ORAL at 09:05

## 2022-05-24 RX ADMIN — OXYCODONE HYDROCHLORIDE AND ACETAMINOPHEN 1 TABLET: 10; 325 TABLET ORAL at 03:05

## 2022-05-24 RX ADMIN — BUTALBITAL, ACETAMINOPHEN, AND CAFFEINE 2 TABLET: 50; 325; 40 TABLET ORAL at 09:05

## 2022-05-24 RX ADMIN — NIFEDIPINE 30 MG: 30 TABLET, FILM COATED, EXTENDED RELEASE ORAL at 09:05

## 2022-05-24 NOTE — PROGRESS NOTES
[facility] - [facility] & Delivery  Obstetrics  Postpartum Progress Note    Patient Name: Latisha Guzmán  MRN: [id number]  Admission Date: 5/21/2022  Hospital Length of Stay: 3 days  Attending Physician: Beena Leigh MD  Primary Care Provider: Debra Tiwari MD    Subjective:     Principal Problem:Fetal demise affecting delivery    Hospital course:   Latisha Guzmán is a 37 y.o. female PPD #3 status post Spontaneous vaginal delivery. Patient admitted for management of fetal demise and sepsis. Pt received 48hrs of IV antibiotics (amp/gent/clindamycin) and was transitioned to oral medication. Discharge held yesterday secondary to preliminary positive blood culture results. Repeat culture results pending. Pt started on procardia 30XL for persistent mild range BP.    Interval History:   She is doing well this morning. Denies fever. She is tolerating a regular diet without nausea or vomiting. She is voiding spontaneously. She is ambulating.  Vaginal bleeding is stable.  Denies vaginal discharge or odor.  She denies fever or chills. Abdominal pain is mild and controlled with oral medications. Reports headache this AM- not alleviated with ibuprofen     Objective:     Vital Signs (Most Recent):  Temp: 98 °F (36.7 °C) (05/24/22 0350)  Pulse: 86 (05/24/22 0500)  Resp: 16 (05/24/22 0331)  BP: (!) 148/83 (05/24/22 0500)  SpO2: 96 % (05/22/22 1645) Vital Signs (24h Range):  Temp:  [97.8 °F (36.6 °C)-99.3 °F (37.4 °C)] 98 °F (36.7 °C)  Pulse:  [] 86  Resp:  [16-17] 16  BP: (132-159)/(69-97) 148/83     Weight: 77.1 kg (169 lb 15.6 oz)  Body mass index is 27.43 kg/m².    No intake or output data in the 24 hours ending 05/24/22 0717    Physical Exam:   Constitutional: She is oriented to person, place, and time. She appears well-developed and well-nourished. No distress.    HENT:   Head: Normocephalic and atraumatic.       Pulmonary/Chest: Effort normal.        Abdominal: Soft.   Fundus-firm, nontender             Musculoskeletal:  Moves all extremeties. No edema.       Neurological: She is alert and oriented to person, place, and time.    Skin: Skin is warm and dry.    Psychiatric: She has a normal mood and affect.       Significant Labs:  Lab Results   Component Value Date    GROUPTRH O POS 2022    HEPBSAG Negative 2022     Recent Labs   Lab 22  0548   HGB 7.4*   HCT 22.9*       CBC:   Recent Labs   Lab 22  0548   WBC 9.93   RBC 3.34*   HGB 7.4*   HCT 22.9*   *   MCV 69*   MCH 22.2*   MCHC 32.3     I have personallly reviewed all pertinent lab results from the last 24 hours.    Assessment/Plan:     37 y.o. female  at Unknown for:    Active Diagnoses:    Diagnosis Date Noted POA    PRINCIPAL PROBLEM:  Fetal demise affecting delivery [O36.4XX0] 2022 Yes    Chorioamnionitis in third trimester [O41.1230] 2022 Yes    Sepsis [A41.9] 2018 Yes      Problems Resolved During this Admission:       1. Postpartum care:  - Patient doing well. Continue routine management and advances.  - Continue PO pain meds. Pain well controlled.  - Encourage ambulation    2. Sepsis- fetal demise/chorio  -leukocytosis resolved. 48 hrs of  Amp/gent/clinda currently on augmentin to complete x 14 days of therapy. Initial blood cultures positive- will await sensitivities and repeat culture results.     3. Tobacco use- nicotine patch ordered    4. GHTN-pt denies hx of HTN. Started on procardia 30Xl. Will continue to monitor. Order for fioricet placed- will f/u to check for resolution. If not will get labs    Disposition: As patient meets milestones, will plan to discharge pending above plan    Beena Leigh MD  Obstetrics  [facility] - Labor & Delivery

## 2022-05-24 NOTE — PROGRESS NOTES
Patient with persistent mild range blood pressures. Started on procardia 30mg PO qhs.    sonia ponce MD

## 2022-05-24 NOTE — PROGRESS NOTES
Smoking cessation education follow-up: No reported nicotine withdrawal symptoms with patch in use. Order requested upon discharge for 14 mg nicotine patch Q day. Reviewed pt's Smoking Cessation clinic appointment details. Appointment scheduled 6/8/22 at 1 o'clock pm with Tiffanie Desai

## 2022-05-25 ENCOUNTER — TELEPHONE (OUTPATIENT)
Dept: OBSTETRICS AND GYNECOLOGY | Facility: CLINIC | Age: 37
End: 2022-05-25
Payer: MEDICAID

## 2022-05-25 VITALS
WEIGHT: 170 LBS | RESPIRATION RATE: 16 BRPM | TEMPERATURE: 99 F | SYSTOLIC BLOOD PRESSURE: 121 MMHG | DIASTOLIC BLOOD PRESSURE: 66 MMHG | OXYGEN SATURATION: 96 % | HEIGHT: 66 IN | HEART RATE: 88 BPM | BODY MASS INDEX: 27.32 KG/M2

## 2022-05-25 PROCEDURE — S4991 NICOTINE PATCH NONLEGEND: HCPCS | Performed by: OBSTETRICS & GYNECOLOGY

## 2022-05-25 PROCEDURE — 99238 PR HOSPITAL DISCHARGE DAY,<30 MIN: ICD-10-PCS | Mod: ,,, | Performed by: OBSTETRICS & GYNECOLOGY

## 2022-05-25 PROCEDURE — 25000003 PHARM REV CODE 250: Performed by: OBSTETRICS & GYNECOLOGY

## 2022-05-25 PROCEDURE — 99238 HOSP IP/OBS DSCHRG MGMT 30/<: CPT | Mod: ,,, | Performed by: OBSTETRICS & GYNECOLOGY

## 2022-05-25 RX ORDER — NIFEDIPINE 30 MG/1
30 TABLET, EXTENDED RELEASE ORAL 2 TIMES DAILY
Qty: 60 TABLET | Refills: 2 | Status: SHIPPED | OUTPATIENT
Start: 2022-05-25 | End: 2023-05-25

## 2022-05-25 RX ORDER — NIFEDIPINE 30 MG/1
30 TABLET, EXTENDED RELEASE ORAL 2 TIMES DAILY
Status: DISCONTINUED | OUTPATIENT
Start: 2022-05-25 | End: 2022-05-25 | Stop reason: HOSPADM

## 2022-05-25 RX ADMIN — NICOTINE 1 PATCH: 14 PATCH, EXTENDED RELEASE TRANSDERMAL at 09:05

## 2022-05-25 RX ADMIN — AMOXICILLIN AND CLAVULANATE POTASSIUM 1 TABLET: 875; 125 TABLET, FILM COATED ORAL at 09:05

## 2022-05-25 RX ADMIN — IBUPROFEN 600 MG: 600 TABLET ORAL at 07:05

## 2022-05-25 RX ADMIN — IBUPROFEN 600 MG: 600 TABLET ORAL at 12:05

## 2022-05-25 RX ADMIN — HYDROCODONE BITARTRATE AND ACETAMINOPHEN 1 TABLET: 5; 325 TABLET ORAL at 07:05

## 2022-05-25 RX ADMIN — FERROUS SULFATE TAB 325 MG (65 MG ELEMENTAL FE) 1 EACH: 325 (65 FE) TAB at 09:05

## 2022-05-25 RX ADMIN — NIFEDIPINE 30 MG: 30 TABLET, FILM COATED, EXTENDED RELEASE ORAL at 09:05

## 2022-05-25 RX ADMIN — HYDROCODONE BITARTRATE AND ACETAMINOPHEN 1 TABLET: 5; 325 TABLET ORAL at 12:05

## 2022-05-25 RX ADMIN — OXYCODONE HYDROCHLORIDE AND ACETAMINOPHEN 1 TABLET: 10; 325 TABLET ORAL at 02:05

## 2022-05-25 NOTE — NURSING
Patient C/O headache 8/10  Per Teressa ROCK.  Teressa ROCK gave the patient her scheduled Ibuprofen and PRN pain medication. Per Teressa ROCK the patient denies any visual disturbance, or any epigastric pain.  I will continue to monitor the patient.

## 2022-05-25 NOTE — TELEPHONE ENCOUNTER
----- Message from Beena Leigh MD sent at 5/25/2022 10:51 AM CDT -----  Regarding: f/u visit  Hey ms eli would need an appointment for a postpartum bp check next week. She had a fetal demise and was infected so she is on antibiotics to continue upon discharge    Beena Leigh MD, FACOG  OB/GYN

## 2022-05-25 NOTE — NURSING
on 22 IUFD.  Patient appears to be sleeping.   0714 gave patient her 0600 Ibuprofen.  The patient C/O lower back pain 7/10 and requested PRN pain meds. PRN Hydrocodone given to the patient.  I will continue to monitor the patient.

## 2022-05-25 NOTE — PROGRESS NOTES
[facility] - [facility] Delivery  Obstetrics  Postpartum Progress Note    Patient Name: Latisha Guzmán  MRN: [id number]  Admission Date: 5/21/2022  Hospital Length of Stay: 4 days  Attending Physician: Beena Leigh MD  Primary Care Provider: Debra Tiwari MD    Subjective:     Principal Problem:Fetal demise affecting delivery    Hospital course:   Latisha Guzmán is a 37 y.o. female PPD #4 status post Spontaneous vaginal delivery. Patient admitted for management of fetal demise and sepsis. Pt received 48hrs of IV antibiotics (amp/gent/clindamycin) and was transitioned to oral medication. Preliminary positive blood culture results. Repeat culture results pending. Pt started on procardia 30XL for GHTN.    Interval History:   She is doing well this morning. Denies fever. She is tolerating a regular diet without nausea or vomiting. She is voiding spontaneously. She is ambulating.  Vaginal bleeding is stable.  Denies vaginal discharge or odor.  She denies fever or chills. Abdominal pain is mild and controlled with oral medications. Denies HA/blurry vision/RUQ/epigastric pain. No CP/SOB.       Objective:     Vital Signs (Most Recent):  Temp: 98.5 °F (36.9 °C) (05/25/22 0918)  Pulse: 88 (05/25/22 0916)  Resp: 16 (05/25/22 0714)  BP: 121/66 (05/25/22 0916)  SpO2: 96 % (05/22/22 1645) Vital Signs (24h Range):  Temp:  [98.5 °F (36.9 °C)] 98.5 °F (36.9 °C)  Pulse:  [80-88] 88  Resp:  [15-16] 16  BP: (121-152)/(66-98) 121/66     Weight: 77.1 kg (169 lb 15.6 oz)  Body mass index is 27.43 kg/m².    No intake or output data in the 24 hours ending 05/25/22 1042    Physical Exam:   Constitutional: She is oriented to person, place, and time. She appears well-developed and well-nourished. No distress.    HENT:   Head: Normocephalic and atraumatic.       Pulmonary/Chest: Effort normal.        Abdominal: Soft.   Fundus-firm, nontender             Musculoskeletal: Moves all extremeties. No edema.       Neurological: She is alert and oriented  to person, place, and time.    Skin: Skin is warm and dry.    Psychiatric: She has a normal mood and affect.       Significant Labs:  Lab Results   Component Value Date    GROUPTRH O POS 2022    HEPBSAG Negative 2022     No results for input(s): HGB, HCT in the last 48 hours.    CBC:   No results for input(s): WBC, RBC, HGB, HCT, PLT, MCV, MCH, MCHC in the last 48 hours.  I have personallly reviewed all pertinent lab results from the last 24 hours.    Assessment/Plan:     37 y.o. female  at Unknown for:    Active Diagnoses:    Diagnosis Date Noted POA    PRINCIPAL PROBLEM:  Fetal demise affecting delivery [O36.4XX0] 2022 Yes    Chorioamnionitis in third trimester [O41.1230] 2022 Yes    Sepsis [A41.9] 2018 Yes      Problems Resolved During this Admission:       1. Postpartum care:  - Patient doing well. Continue routine management and advances.  - Continue PO pain meds. Pain well controlled.  - Encourage ambulation    2. Sepsis- fetal demise/chorio  -s/p 48 hrs of  Amp/gent/clinda currently on augmentin to complete x 14 days of therapy. Initial blood cultures positive- awaiting sensitivities and repeat culture results.     3. Tobacco use- nicotine patch ordered    4. GHTN-pt denies hx of HTN. Currently procardia 30Xl- will increased to bid dosing. BP: (130-152)/(75-98) 152/98      Disposition: As patient meets milestones, will plan to discharge pending culture results.    Beena Leigh MD  Obstetrics  [facility] - [facility] Delivery

## 2022-05-25 NOTE — PROGRESS NOTES
Rx Message- Therapy with gentamicin is complete and/or discontinued by provider. gentamicin consult and lab orders per protocol are now discontinued. Pharmacy will sign off at this time.Please re-consult as needed. Thanks

## 2022-05-25 NOTE — NURSING
Nurse reviewed discharge instructions with pt re: medication and follow up appointments. IV discontinued with catheter intact, pressure applied to site and secured with tape. Educational handouts provided to patient.  Patient advised to keep her scheduled appointment on May 30th, with Dr Leigh. Patient advised to monitor her B/P and her Temp.  Patient educated on the importance of taking all of her medications. Patient awaiting for her neighbor to arrive to pick her up.

## 2022-05-25 NOTE — DISCHARGE SUMMARY
[facility] - [facility]  Obstetrics  Discharge Summary      Patient Name: Latisha Guzmán  MRN: [id number]  Admission Date: 5/21/2022  Hospital Length of Stay: 4 days  Discharge Date:  05/25/2022   Attending Physician: Beena Leigh MD   Discharging Provider: Beena Leigh MD  Primary Care Provider: Debra Tiwari MD      Hospital Course:   Latisha Guzmán is a 37 y.o. female PPD #4 status post Spontaneous vaginal delivery. Patient admitted for management of fetal demise and sepsis. Pt received 48hrs of IV antibiotics (amp/gent/clindamycin) and was transitioned to oral medication- augmentin. First blood cultures from OSH were positive. repeat blood cultures- no growth to date. Pt also noted to have elevated bp's and started on procardia 30XL bid or GHTN. Pt remained afebrile throughout hospitalization and initial leukocytosis resolved. Pt with minimal vaginal bleeding. Pt deemed in stable condition for discharge to home with close f/u for BP check and will f/u on blood culture.     Final Active Diagnoses:    Diagnosis Date Noted POA    PRINCIPAL PROBLEM:  Fetal demise affecting delivery [O36.4XX0] 05/21/2022 Yes    Chorioamnionitis in third trimester [O41.1230] 05/21/2022 Yes    Sepsis [A41.9] 11/01/2018 Yes      Problems Resolved During this Admission:        Labs: CBC No results for input(s): WBC, HGB, HCT, PLT in the last 48 hours.      Immunizations     Date Immunization Status Dose Route/Site Given by    05/21/22 1754 MMR Incomplete 0.5 mL Subcutaneous/     05/21/22 1754 Tdap Incomplete 0.5 mL Intramuscular/           Delivery:    Episiotomy:     Lacerations:     Repair suture:     Repair # of packets:     Blood loss (ml):       Birth information:  YOB: 2023   Time of birth: 4:27 PM   Sex: male   Delivery type: Vaginal, Spontaneous   Gestational Age: <None>    Delivery Clinician:      Other providers:       Additional  information:  Forceps:    Vacuum:    Breech:    Observed anomalies       Living?:           APGARS  One minute Five minutes Ten minutes   Skin color:         Heart rate:         Grimace:         Muscle tone:         Breathing:         Totals: 0  0  0      Placenta: Delivered:       appearance    Pending Diagnostic Studies:     Procedure Component Value Units Date/Time    Specimen to Pathology, Surgery Gynecology and Obstetrics [[id number]] Collected: 05/21/22 1839    Order Status: Sent Lab Status: In process Updated: 05/23/22 0740    Specimen: Tissue     Specimen to Pathology, Surgery Gynecology and Obstetrics [[id number]] Collected: 05/21/22 1641    Order Status: Sent Lab Status: In process Updated: 05/21/22 1642    Specimen: Tissue           Discharged Condition: good    Disposition: Home or Self Care    Follow Up: next week    Patient Instructions:      Diet Adult Regular     Pelvic Rest     Notify your health care provider if you experience any of the following:  temperature >100.4     Notify your health care provider if you experience any of the following:  persistent nausea and vomiting or diarrhea     Notify your health care provider if you experience any of the following:  severe uncontrolled pain     Notify your health care provider if you experience any of the following:  difficulty breathing or increased cough     Notify your health care provider if you experience any of the following:  severe persistent headache     Notify your health care provider if you experience any of the following:  worsening rash     Notify your health care provider if you experience any of the following:  persistent dizziness, light-headedness, or visual disturbances     Notify your health care provider if you experience any of the following:  increased confusion or weakness     Medications:  Current Discharge Medication List      START taking these medications    Details   amoxicillin-clavulanate 875-125mg (AUGMENTIN) 875-125 mg per tablet Take 1 tablet by mouth every 12 (twelve) hours. for 14  days  Qty: 28 tablet, Refills: 0      ferrous sulfate 325 (65 FE) MG EC tablet Take 1 tablet (325 mg total) by mouth 2 (two) times daily.  Qty: 60 tablet, Refills: 0      nicotine (NICODERM CQ) 14 mg/24 hr Place 1 patch onto the skin once daily.  Qty: 28 patch, Refills: 0      NIFEdipine (PROCARDIA-XL) 30 MG (OSM) 24 hr tablet Take 1 tablet (30 mg total) by mouth 2 (two) times a day.  Qty: 60 tablet, Refills: 2    Comments: .         CONTINUE these medications which have CHANGED    Details   HYDROcodone-acetaminophen (NORCO) 5-325 mg per tablet Take 1 tablet by mouth every 6 (six) hours as needed for Pain.  Qty: 10 tablet, Refills: 0    Comments: Quantity prescribed more than 7 day supply? No      ibuprofen (ADVIL,MOTRIN) 600 MG tablet Take 1 tablet (600 mg total) by mouth every 6 (six) hours as needed for Pain.  Qty: 30 tablet, Refills: 0         STOP taking these medications       tamsulosin (FLOMAX) 0.4 mg Cap Comments:   Reason for Stopping:               Beena Leigh MD  Obstetrics  [facility] - Labor & Delivery

## 2022-05-26 LAB
FINAL PATHOLOGIC DIAGNOSIS: NORMAL
GROSS: NORMAL
Lab: NORMAL

## 2022-05-29 LAB
BACTERIA BLD CULT: NORMAL
BACTERIA BLD CULT: NORMAL

## 2022-06-09 ENCOUNTER — TELEPHONE (OUTPATIENT)
Dept: SMOKING CESSATION | Facility: CLINIC | Age: 37
End: 2022-06-09
Payer: MEDICAID

## 2023-09-23 ENCOUNTER — HOSPITAL ENCOUNTER (EMERGENCY)
Facility: HOSPITAL | Age: 38
Discharge: HOME | End: 2023-09-24
Attending: EMERGENCY MEDICINE
Payer: MEDICAID

## 2023-09-23 DIAGNOSIS — R60.9 SWELLING: ICD-10-CM

## 2023-09-23 DIAGNOSIS — I87.8 VENOUS STASIS: Primary | ICD-10-CM

## 2023-09-23 DIAGNOSIS — L03.90 CELLULITIS, UNSPECIFIED CELLULITIS SITE: ICD-10-CM

## 2023-09-23 DIAGNOSIS — R05.9 COUGH: ICD-10-CM

## 2023-09-23 DIAGNOSIS — R10.9 FLANK PAIN: ICD-10-CM

## 2023-09-23 PROCEDURE — 81025 URINE PREGNANCY TEST: CPT | Mod: ER | Performed by: EMERGENCY MEDICINE

## 2023-09-23 PROCEDURE — 99285 EMERGENCY DEPT VISIT HI MDM: CPT | Mod: ER

## 2023-09-23 RX ORDER — LIDOCAINE 50 MG/G
1 PATCH TOPICAL
Status: DISCONTINUED | OUTPATIENT
Start: 2023-09-24 | End: 2023-09-24 | Stop reason: HOSPADM

## 2023-09-24 VITALS
TEMPERATURE: 98 F | DIASTOLIC BLOOD PRESSURE: 79 MMHG | HEART RATE: 91 BPM | OXYGEN SATURATION: 99 % | RESPIRATION RATE: 20 BRPM | SYSTOLIC BLOOD PRESSURE: 135 MMHG | WEIGHT: 192 LBS | BODY MASS INDEX: 30.13 KG/M2 | HEIGHT: 67 IN

## 2023-09-24 LAB
ALBUMIN SERPL BCP-MCNC: 4 G/DL (ref 3.5–5.2)
ALP SERPL-CCNC: 104 U/L (ref 38–126)
ALT SERPL W/O P-5'-P-CCNC: 16 U/L (ref 10–44)
ANION GAP SERPL CALC-SCNC: 11 MMOL/L (ref 8–16)
AST SERPL-CCNC: 31 U/L (ref 15–46)
B-HCG UR QL: NEGATIVE
BASOPHILS # BLD AUTO: 0.05 K/UL (ref 0–0.2)
BASOPHILS NFR BLD: 0.6 % (ref 0–1.9)
BILIRUB SERPL-MCNC: 0.3 MG/DL (ref 0.1–1)
BILIRUB UR QL STRIP: NEGATIVE
CALCIUM SERPL-MCNC: 9 MG/DL (ref 8.7–10.5)
CHLORIDE SERPL-SCNC: 105 MMOL/L (ref 95–110)
CLARITY UR REFRACT.AUTO: CLEAR
CO2 SERPL-SCNC: 22 MMOL/L (ref 23–29)
COLOR UR AUTO: YELLOW
CREAT SERPL-MCNC: 0.71 MG/DL (ref 0.5–1.4)
CTP QC/QA: YES
DIFFERENTIAL METHOD: ABNORMAL
EOSINOPHIL # BLD AUTO: 0.2 K/UL (ref 0–0.5)
EOSINOPHIL NFR BLD: 2.2 % (ref 0–8)
ERYTHROCYTE [DISTWIDTH] IN BLOOD BY AUTOMATED COUNT: 20.5 % (ref 11.5–14.5)
EST. GFR  (NO RACE VARIABLE): >60 ML/MIN/1.73 M^2
GLUCOSE SERPL-MCNC: 119 MG/DL (ref 70–110)
GLUCOSE UR QL STRIP: NEGATIVE
HCT VFR BLD AUTO: 25.5 % (ref 37–48.5)
HGB BLD-MCNC: 6.6 G/DL (ref 12–16)
HGB UR QL STRIP: ABNORMAL
IMM GRANULOCYTES # BLD AUTO: 0.02 K/UL (ref 0–0.04)
IMM GRANULOCYTES NFR BLD AUTO: 0.3 % (ref 0–0.5)
KETONES UR QL STRIP: NEGATIVE
LEUKOCYTE ESTERASE UR QL STRIP: ABNORMAL
LIPASE SERPL-CCNC: 56 U/L (ref 23–300)
LYMPHOCYTES # BLD AUTO: 2.2 K/UL (ref 1–4.8)
LYMPHOCYTES NFR BLD: 28.3 % (ref 18–48)
MCH RBC QN AUTO: 16.3 PG (ref 27–31)
MCHC RBC AUTO-ENTMCNC: 25.9 G/DL (ref 32–36)
MCV RBC AUTO: 63 FL (ref 82–98)
MICROSCOPIC COMMENT: ABNORMAL
MONOCYTES # BLD AUTO: 0.8 K/UL (ref 0.3–1)
MONOCYTES NFR BLD: 9.8 % (ref 4–15)
NEUTROPHILS # BLD AUTO: 4.6 K/UL (ref 1.8–7.7)
NEUTROPHILS NFR BLD: 58.8 % (ref 38–73)
NITRITE UR QL STRIP: NEGATIVE
NRBC BLD-RTO: 0 /100 WBC
NT-PROBNP SERPL-MCNC: 134 PG/ML (ref 5–450)
PH UR STRIP: 7 [PH] (ref 5–8)
PLATELET # BLD AUTO: 381 K/UL (ref 150–450)
PMV BLD AUTO: 9 FL (ref 9.2–12.9)
POTASSIUM SERPL-SCNC: 4.3 MMOL/L (ref 3.5–5.1)
PROT SERPL-MCNC: 7.5 G/DL (ref 6–8.4)
PROT UR QL STRIP: NEGATIVE
RBC # BLD AUTO: 4.05 M/UL (ref 4–5.4)
RBC #/AREA URNS AUTO: 10 /HPF (ref 0–4)
SARS-COV-2 RDRP RESP QL NAA+PROBE: NEGATIVE
SODIUM SERPL-SCNC: 138 MMOL/L (ref 136–145)
SP GR UR STRIP: 1.02 (ref 1–1.03)
TROPONIN I SERPL-MCNC: <0.012 NG/ML (ref 0.01–0.03)
URN SPEC COLLECT METH UR: ABNORMAL
UROBILINOGEN UR STRIP-ACNC: NEGATIVE EU/DL
UUN UR-MCNC: 14 MG/DL (ref 7–17)
WBC # BLD AUTO: 7.84 K/UL (ref 3.9–12.7)
WBC #/AREA URNS AUTO: 3 /HPF (ref 0–5)

## 2023-09-24 PROCEDURE — U0002 COVID-19 LAB TEST NON-CDC: HCPCS | Mod: ER | Performed by: EMERGENCY MEDICINE

## 2023-09-24 PROCEDURE — 93010 EKG 12-LEAD: ICD-10-PCS | Mod: ,,, | Performed by: INTERNAL MEDICINE

## 2023-09-24 PROCEDURE — 85025 COMPLETE CBC W/AUTO DIFF WBC: CPT | Mod: ER | Performed by: EMERGENCY MEDICINE

## 2023-09-24 PROCEDURE — 80053 COMPREHEN METABOLIC PANEL: CPT | Mod: ER | Performed by: EMERGENCY MEDICINE

## 2023-09-24 PROCEDURE — 93005 ELECTROCARDIOGRAM TRACING: CPT | Mod: ER

## 2023-09-24 PROCEDURE — 99900035 HC TECH TIME PER 15 MIN (STAT): Mod: ER

## 2023-09-24 PROCEDURE — 83880 ASSAY OF NATRIURETIC PEPTIDE: CPT | Mod: ER | Performed by: EMERGENCY MEDICINE

## 2023-09-24 PROCEDURE — 80307 DRUG TEST PRSMV CHEM ANLYZR: CPT | Mod: ER | Performed by: EMERGENCY MEDICINE

## 2023-09-24 PROCEDURE — 93010 ELECTROCARDIOGRAM REPORT: CPT | Mod: ,,, | Performed by: INTERNAL MEDICINE

## 2023-09-24 PROCEDURE — 84484 ASSAY OF TROPONIN QUANT: CPT | Mod: ER | Performed by: EMERGENCY MEDICINE

## 2023-09-24 PROCEDURE — 83690 ASSAY OF LIPASE: CPT | Mod: ER | Performed by: EMERGENCY MEDICINE

## 2023-09-24 PROCEDURE — 25000003 PHARM REV CODE 250: Mod: ER | Performed by: EMERGENCY MEDICINE

## 2023-09-24 PROCEDURE — 81000 URINALYSIS NONAUTO W/SCOPE: CPT | Mod: ER | Performed by: EMERGENCY MEDICINE

## 2023-09-24 RX ORDER — ACETAMINOPHEN 500 MG
1000 TABLET ORAL
Status: COMPLETED | OUTPATIENT
Start: 2023-09-24 | End: 2023-09-24

## 2023-09-24 RX ORDER — CYCLOBENZAPRINE HCL 10 MG
10 TABLET ORAL
Status: COMPLETED | OUTPATIENT
Start: 2023-09-24 | End: 2023-09-24

## 2023-09-24 RX ORDER — CLINDAMYCIN HYDROCHLORIDE 150 MG/1
300 CAPSULE ORAL 4 TIMES DAILY
Qty: 56 CAPSULE | Refills: 0 | Status: SHIPPED | OUTPATIENT
Start: 2023-09-24 | End: 2023-10-01

## 2023-09-24 RX ORDER — CLINDAMYCIN HYDROCHLORIDE 150 MG/1
300 CAPSULE ORAL
Status: COMPLETED | OUTPATIENT
Start: 2023-09-24 | End: 2023-09-24

## 2023-09-24 RX ORDER — CYCLOBENZAPRINE HCL 10 MG
10 TABLET ORAL 3 TIMES DAILY PRN
Qty: 15 TABLET | Refills: 0 | Status: SHIPPED | OUTPATIENT
Start: 2023-09-24 | End: 2023-09-29

## 2023-09-24 RX ORDER — LIDOCAINE 50 MG/G
1 PATCH TOPICAL DAILY
Qty: 10 PATCH | Refills: 0 | Status: SHIPPED | OUTPATIENT
Start: 2023-09-24

## 2023-09-24 RX ADMIN — CYCLOBENZAPRINE 10 MG: 10 TABLET, FILM COATED ORAL at 03:09

## 2023-09-24 RX ADMIN — ACETAMINOPHEN 1000 MG: 500 TABLET ORAL at 03:09

## 2023-09-24 RX ADMIN — CLINDAMYCIN HYDROCHLORIDE 300 MG: 150 CAPSULE ORAL at 03:09

## 2023-09-24 RX ADMIN — LIDOCAINE 1 PATCH: 50 PATCH TOPICAL at 01:09

## 2023-09-24 NOTE — ED PROVIDER NOTES
"Encounter Date: 9/23/2023       History     Chief Complaint   Patient presents with    Flank Pain     Reports to ED c c/o L flank pain x 2 days     Leg Swelling     Pt also reports bilateral lower leg swelling +redness noted      HPI  38 y.o.   L flank pain nr atraumatic, mod constant x 2 days  "I'm prone to kidney stones"    Also co redness swelling to LEs, denies injury to legs, no fevers    Review of patient's allergies indicates:   Allergen Reactions    Toradol [ketorolac]     Tramadol Hives    Tramadol      Past Medical History:   Diagnosis Date    Kidney stones     Mental disorder     anxiety     Past Surgical History:   Procedure Laterality Date    APPENDECTOMY      CYSTOSCOPY W/ URETERAL STENT PLACEMENT Left 11/1/2018    Procedure: CYSTOSCOPY, WITH URETERAL STENT INSERTION;  Surgeon: Teressa Mobley MD;  Location: [facility] OR [address] [place] [address];  Service: Urology;  Laterality: Left;    CYSTOSCOPY W/ URETERAL STENT REMOVAL Left 11/12/2018    Procedure: CYSTOSCOPY, WITH URETERAL STENT REMOVAL;  Surgeon: Teressa Mobley MD;  Location: [facility] OR [address] [place] [address];  Service: Urology;  Laterality: Left;    LASER LITHOTRIPSY Left 11/12/2018    Procedure: LITHOTRIPSY, USING LASER;  Surgeon: Teressa Mobley MD;  Location: [facility] OR [address] [place] [address];  Service: Urology;  Laterality: Left;    REPLACEMENT OF STENT Left 11/12/2018    Procedure: REPLACEMENT, STENT;  Surgeon: Teressa Mobley MD;  Location: [facility] OR [address] [place] [address];  Service: Urology;  Laterality: Left;    URETEROSCOPIC REMOVAL OF URETERIC CALCULUS Left 11/12/2018    Procedure: REMOVAL, CALCULUS, URETER, URETEROSCOPIC;  Surgeon: Teressa Mobley MD;  Location: [address];  Service: Urology;  Laterality: Left;  1 hour     No family history on file.  Social History     Tobacco Use    Smoking status: Every Day     Current packs/day: 0.50     Average packs/day: 0.5 packs/day for 26.7 years (13.4 ttl pk-yrs)     Types: Cigarettes     Start date: 1997    Smokeless " tobacco: Never    Tobacco comments:     Ambulatory referral to Smoking Cessation Program.   Substance Use Topics    Alcohol use: No    Drug use: No     Review of Systems  All systems were reviewed/examined and were negative except as noted in the HPI.    Physical Exam     Initial Vitals   BP Pulse Resp Temp SpO2   09/23/23 2348 09/23/23 2348 09/23/23 2347 09/23/23 2347 09/23/23 2348   (!) 159/83 109 18 98.4 °F (36.9 °C) 100 %      MAP       --                Physical Exam    General: the patient is awake, alert, and in no apparent distress.  Head: normocephalic and atraumatic, sclera are clear  Neck: supple without meningismus  Chest: clear to auscultation bilaterally, no respiratory distress  Heart: regular rate and rhythm  ABD soft, nontender, nondistended, no peritoneal signs  Pretibial venous stasis changes   Extremities: warm and well perfused  Skin: warm and dry  Psych conversant  Neuro: awake, alert, moving all extremities    ED Course   Procedures  Labs Reviewed   URINALYSIS, REFLEX TO URINE CULTURE - Abnormal; Notable for the following components:       Result Value    Occult Blood UA 2+ (*)     Leukocytes, UA Trace (*)     All other components within normal limits    Narrative:     Preferred Collection Type->Urine, Clean Catch  Specimen Source->Urine   CBC W/ AUTO DIFFERENTIAL - Abnormal; Notable for the following components:    Hemoglobin 6.6 (*)     Hematocrit 25.5 (*)     MCV 63 (*)     MCH 16.3 (*)     MCHC 25.9 (*)     RDW 20.5 (*)     MPV 9.0 (*)     All other components within normal limits   COMPREHENSIVE METABOLIC PANEL - Abnormal; Notable for the following components:    CO2 22 (*)     Glucose 119 (*)     All other components within normal limits   URINALYSIS MICROSCOPIC - Abnormal; Notable for the following components:    RBC, UA 10 (*)     All other components within normal limits    Narrative:     Preferred Collection Type->Urine, Clean Catch  Specimen Source->Urine   DRUG SCREEN PANEL, URINE  EMERGENCY    Narrative:     Specimen Source->Urine   LIPASE   NT-PRO NATRIURETIC PEPTIDE   TROPONIN I   SARS-COV-2 RNA AMPLIFICATION, QUAL    Narrative:     Is the patient symptomatic?->Yes   POCT URINE PREGNANCY          Imaging Results              CT Renal Stone Study ABD Pelvis WO (In process)                      X-Ray Chest AP Portable (In process)                      Medications   LIDOcaine 5 % patch 1 patch (1 patch Transdermal Patch Applied 9/24/23 0136)   clindamycin capsule 300 mg (300 mg Oral Given 9/24/23 0338)   acetaminophen tablet 1,000 mg (1,000 mg Oral Given 9/24/23 0338)   cyclobenzaprine tablet 10 mg (10 mg Oral Given 9/24/23 0338)     Medical Decision Making  Amount and/or Complexity of Data Reviewed  Labs: ordered.  Radiology: ordered.    Risk  OTC drugs.  Prescription drug management.    Medical Decision Making:    This is an emergent evaluation of a patient presenting to the ED.  Nursing notes were reviewed.  I personally reviewed, read, and interpreted the ECG and any monitoring strips.  ECG: normal EKG, normal sinus rhythm, unchanged from previous tracings Compared with prior if available.  Read and interpreted by me independently.      I reviewed radiology images personally along with interpretations.  CT no obstructing stones  I personally reviewed and interpreted the laboratory results.  Non critical  I decided to obtain and review old medical records, which showed: ED visits and outpatient care    Evaluation for Emergency Medical Condition  The patient received a medical screening exam and within a reasonable degree of clinical confidence an emergency medical condition has not been identified.  The patient is instructed on proper follow up and return precautions to the ED.    The patient was encouraged strongly to get the COVID-19 vaccine either after asymptomatic (if COVID positive) or offered it here in the ED is COVID negative.  The patient was also encouraged to obtain an influenza  vaccination if available once asymptomatic (if positive) or if testing negative in the ED.    The patient was encouraged and counseled to quit smoking and use of tobacco products, including the effect on their health.  Smoking cessation resources were provided.      Epifanio Castro MD, [facility]                             Clinical Impression:   Final diagnoses:  [R05.9] Cough  [R60.9] Swelling  [I87.8] Venous stasis (Primary)  [L03.90] Cellulitis, unspecified cellulitis site  [R10.9] Flank pain        ED Disposition Condition    Discharge Stable          ED Prescriptions       Medication Sig Dispense Start Date End Date Auth. Provider    LIDOcaine (LIDODERM) 5 % Place 1 patch onto the skin once daily. Remove & Discard patch within 12 hours or as directed by MD 10 patch 9/24/2023 -- Cj Castro MD    clindamycin (CLEOCIN) 150 MG capsule Take 2 capsules (300 mg total) by mouth 4 (four) times daily. for 7 days 56 capsule 9/24/2023 10/1/2023 Cj Castro MD    cyclobenzaprine (FLEXERIL) 10 MG tablet Take 1 tablet (10 mg total) by mouth 3 (three) times daily as needed for Muscle spasms. 15 tablet 9/24/2023 9/29/2023 Cj Castro MD          Follow-up Information       Follow up With Specialties Details Why Contact Info    Debra Tiwari MD Internal Medicine Schedule an appointment as soon as possible for a visit   [address] [facility] [address]  [facility] [address]  [phone]            Discharged to home in stable condition, return to ED warnings given, follow up and patient care instructions given.      Epifanio Castro MD, [facility], [facility]  Department of Emergency Medicine       Cj Castro MD  09/24/23 1769

## 2023-09-25 LAB
AMPHET+METHAMPHET UR QL: ABNORMAL
BARBITURATES UR QL SCN>200 NG/ML: NEGATIVE
BENZODIAZ UR QL SCN>200 NG/ML: NEGATIVE
BZE UR QL SCN: NEGATIVE
CANNABINOIDS UR QL SCN: NEGATIVE
CREAT UR-MCNC: 222.4 MG/DL (ref 15–325)
METHADONE UR QL SCN>300 NG/ML: NEGATIVE
OPIATES UR QL SCN: NEGATIVE
PCP UR QL SCN>25 NG/ML: NEGATIVE
TOXICOLOGY INFORMATION: ABNORMAL

## (undated) DEVICE — SET CYSTO IRR DRP CHMBR 84IN

## (undated) DEVICE — GOWN SMARTGOWN LVL4 X-LONG XL

## (undated) DEVICE — TRAY CYSTO BASIN

## (undated) DEVICE — PACK CYSTO

## (undated) DEVICE — SOL IRR NACL .9% 3000ML

## (undated) DEVICE — SOL IRR WATER STRL 3000 ML

## (undated) DEVICE — GUIDE WIRE MOTION .035 X 150CM

## (undated) DEVICE — SYR 10CC LUER LOCK

## (undated) DEVICE — IRRIGATION SET Y-TYPE TUR/BLAD